# Patient Record
Sex: MALE | Race: WHITE | NOT HISPANIC OR LATINO | Employment: OTHER | URBAN - METROPOLITAN AREA
[De-identification: names, ages, dates, MRNs, and addresses within clinical notes are randomized per-mention and may not be internally consistent; named-entity substitution may affect disease eponyms.]

---

## 2024-03-26 ENCOUNTER — HOSPITAL ENCOUNTER (INPATIENT)
Facility: HOSPITAL | Age: 84
LOS: 4 days | Discharge: HOME | DRG: 057 | End: 2024-03-30
Attending: PHYSICAL MEDICINE & REHABILITATION | Admitting: PHYSICAL MEDICINE & REHABILITATION
Payer: MEDICARE

## 2024-03-26 RX ORDER — ATORVASTATIN CALCIUM 20 MG/1
10 TABLET, FILM COATED ORAL DAILY
Status: DISCONTINUED | OUTPATIENT
Start: 2024-03-26 | End: 2024-03-26

## 2024-03-26 RX ORDER — ASPIRIN 81 MG/1
81 TABLET, CHEWABLE ORAL
Status: DISCONTINUED | OUTPATIENT
Start: 2024-04-03 | End: 2024-03-30 | Stop reason: HOSPADM

## 2024-03-26 RX ORDER — SIMVASTATIN 20 MG
20 TABLET ORAL NIGHTLY
COMMUNITY

## 2024-03-26 RX ORDER — LISINOPRIL 10 MG/1
10 TABLET ORAL
Status: DISCONTINUED | OUTPATIENT
Start: 2024-03-27 | End: 2024-03-30 | Stop reason: HOSPADM

## 2024-03-26 RX ORDER — ASPIRIN 81 MG/1
81 TABLET, CHEWABLE ORAL DAILY
Status: ON HOLD | COMMUNITY
End: 2024-03-30

## 2024-03-26 RX ORDER — LISINOPRIL 10 MG/1
10 TABLET ORAL DAILY
COMMUNITY

## 2024-03-26 RX ORDER — EZETIMIBE 10 MG/1
10 TABLET ORAL DAILY
COMMUNITY

## 2024-03-26 RX ORDER — SIMVASTATIN 20 MG
20 TABLET ORAL NIGHTLY
Status: DISCONTINUED | OUTPATIENT
Start: 2024-03-26 | End: 2024-03-30 | Stop reason: HOSPADM

## 2024-03-26 RX ADMIN — METOPROLOL TARTRATE 25 MG: 25 TABLET, FILM COATED ORAL at 21:35

## 2024-03-26 NOTE — PROGRESS NOTES
Inpatient Rehabilitation Plan of Care Note    Plan of Care  Care Plan Reviewed - Updates as Follows    Safety    [RN] Potential for Injury(Active)  Current Status(03/26/2024): Uses call light appropriately  Weekly Goal(04/02/2024): Cue to use call bell  Discharge Goal: Family /caregivers regarding safety precautions and need for  close supervision    Performed Intervention(s)  Safety rounds  Bed alarm and/or chair alarm  Falls precautions/protocol      Sphincter Control    [RN] Bladder Management(Active)  Current Status(03/26/2024): Continent 100%  Weekly Goal(04/02/2024): Continent 100%  Discharge Goal: Continent 100%    [RN] Bowel Management(Active)  Current Status(03/26/2024): Continent 100%  Weekly Goal(04/02/2024): Continent 100%  Discharge Goal: Continent 100%    Performed Intervention(s)  Encourage fluid intake  Perineal care  Encourage appropriate diet      Psychosocial    [RN] Behavior(Active)  Current Status(03/26/2024): Supportive family  Weekly Goal(04/02/2024): Alow opportunity to express concerns regarding current  status  Discharge Goal: Adequate coping regarding life changes with ongoing support    Performed Intervention(s)  Verbalizes needs and concerns  Therapeutic environmental set up  Support/peer groups      Body Function Structure    [RN] Skin Integrity(Active)  Current Status(03/26/2024): Skin intact  Weekly Goal(04/02/2024): Skin intact  Discharge Goal: No skin breakdown/issues    Performed Intervention(s)  Daily skin inspection  Positioning  Turning    Signed by: Susannah Goodwin RN

## 2024-03-26 NOTE — DISCHARGE INSTRUCTIONS
Discharge follow up:  1) PCP - 1 to 2 weeks following discharge from rehab  2) Fermín Rosen, neurosurgery PA - 4 to 6 weeks     Neurosurgery at [facility] recommended holding ASA 81 for a full 2 weeks following the date of his hemorrhage    - Long-term BP goal less than 130/80  - Anti-Thrombotics - has coronary stents. Would resume ASA 81 mg daily in  2 weeks from March 19, 2024 which would be April 2, 2024, but will need close follow up for progression of cerebral amyloid angiopathy   Outpatient neurosurgery follow-up in 4 to 6 weeks    PREVIOUSLY REFERRED TO [facility] FOR OUTPATIENT PT,OT, SLP - DX: CVA    No driving.  No alcohol    Consider trial of Namenda-memantine for aphasia

## 2024-03-26 NOTE — PLAN OF CARE
Goal Outcome Evaluation:  Plan of Care Reviewed With: patient, family           Outcome Evaluation: New to Rehab, expressive aphasia, and assist x1. He is continent of b/b,inc at times. He takes medication with water, NIH at shift change, and no complaints. Discussed daily schedule, white board at nurses station, and almost completed data base. Dont leave alone, patient leans and has had several bouts of loss of balance.

## 2024-03-27 PROBLEM — I61.9 INTRACEREBRAL HEMORRHAGE: Status: ACTIVE | Noted: 2024-03-27

## 2024-03-27 PROCEDURE — 97166 OT EVAL MOD COMPLEX 45 MIN: CPT

## 2024-03-27 PROCEDURE — 97530 THERAPEUTIC ACTIVITIES: CPT

## 2024-03-27 PROCEDURE — 97161 PT EVAL LOW COMPLEX 20 MIN: CPT

## 2024-03-27 PROCEDURE — 96105 ASSESSMENT OF APHASIA: CPT

## 2024-03-27 PROCEDURE — 97116 GAIT TRAINING THERAPY: CPT

## 2024-03-27 PROCEDURE — 97112 NEUROMUSCULAR REEDUCATION: CPT

## 2024-03-27 PROCEDURE — 97535 SELF CARE MNGMENT TRAINING: CPT

## 2024-03-27 RX ORDER — ACETAMINOPHEN 325 MG/1
650 TABLET ORAL EVERY 6 HOURS PRN
Status: DISCONTINUED | OUTPATIENT
Start: 2024-03-27 | End: 2024-03-30 | Stop reason: HOSPADM

## 2024-03-27 RX ADMIN — Medication 20 MG: at 21:14

## 2024-03-27 RX ADMIN — METOPROLOL TARTRATE 25 MG: 25 TABLET, FILM COATED ORAL at 08:10

## 2024-03-27 RX ADMIN — LISINOPRIL 10 MG: 10 TABLET ORAL at 08:10

## 2024-03-27 RX ADMIN — METOPROLOL TARTRATE 25 MG: 25 TABLET, FILM COATED ORAL at 21:14

## 2024-03-27 NOTE — PLAN OF CARE
Goal Outcome Evaluation:  Plan of Care Reviewed With: patient, spouse           Outcome Evaluation: Negro has been cooperative, assist x1, and medication with water. He has been a BLUE ARM BAND,family requested 4 rails, and wife stays through the night. He has been continent of b/b, NIH at shift change, and family checked off the assist patient in room. He tolerated all therapy, wife at bedside, and no pain.

## 2024-03-27 NOTE — THERAPY TREATMENT NOTE
Inpatient Rehabilitation - Physical Therapy Initial Evaluation       [facility]     Patient Name: Robby Mccloud  : 1940  MRN: [id number]    Today's Date: 3/27/2024                    Admit Date: 3/26/2024      Visit Dx:   No diagnosis found.    Patient Active Problem List   Diagnosis    Intracerebral hemorrhage       Past Medical History:   Diagnosis Date    Cancer     Coronary artery disease     Elevated cholesterol     Hypertension     Stroke        Past Surgical History:   Procedure Laterality Date    ABDOMINAL SURGERY      CARDIAC CATHETERIZATION      COLONOSCOPY      SKIN BIOPSY         PT ASSESSMENT (Last 12 Hours)       IRF PT Evaluation and Treatment       Row Name 24 1100          PT Time and Intention    Document Type initial evaluation (P)   -JT     Mode of Treatment physical therapy (P)   -JT     Patient/Family/Caregiver Comments/Observations pt in BR with OT in am, no distress. Wife and son present for both am and pm sessions. (P)   -JT       Row Name 24 1100          General Information    Patient Profile Reviewed yes (P)   -JT     Limitations/Impairments safety/cognitive (P)   -JT       Row Name 24 1100          Previous Level of Function/Home Environm    Bathing/Grooming, Premorbid Functional Level independent (P)   -JT     Dressing, Premorbid Functional Level independent (P)   -JT     Eating/Feeding, Premorbid Functional Level independent (P)   -JT     Toileting, Premorbid Functional Level independent (P)   -JT     BADLs, Premorbid Functional Level independent (P)   -JT     IADLs, Premorbid Functional Level independent (P)   -JT     Bed Mobility, Premorbid Functional Level independent (P)   -JT     Transfers, Premorbid Functional Level independent (P)   -JT     Household Ambulation, Premorbid Functional Level independent (P)   -JT     Stairs, Premorbid Functional Level independent (P)   -JT     Community Ambulation, Premorbid Functional Level independent (P)   -JT        Row Name 03/27/24 1100          Living Environment    Current Living Arrangements home (P)   -JT     Home Accessibility stairs to enter home;stairs within home (P)   -JT     People in Home spouse (P)   -JT     Primary Care Provided by self (P)   -JT       Row Name 03/27/24 1100          Home Main Entrance    Number of Stairs, Main Entrance two (P)   -JT     Stair Railings, Main Entrance railing on left side (ascending) (P)   -JT       Row Name 03/27/24 1100          Stairs Within Home, Primary    Stairs, Within Home, Primary 12 (P)   -JT     Stairs Comment, Within Home, Primary pts family reports he does not need to ambulate these steps for any ADLs at home. (P)   -JT       Row Name 03/27/24 1100          Home Use of Assistive/Adaptive Equipment    Equipment Currently Used at Home none (P)   has a cane that he does not use.  -T       Row Name 03/27/24 1100          Pain Assessment    Pretreatment Pain Rating 0/10 - no pain (P)   -JT     Posttreatment Pain Rating 0/10 - no pain (P)   -JT       Row Name 03/27/24 1100          Cognition/Psychosocial    Affect/Mental Status (Cognition) WFL (P)   -JT     Orientation Status (Cognition) oriented x 4 (P)   -JT     Follows Commands (Cognition) follows one-step commands;over 90% accuracy;delayed response/completion;increased processing time needed (P)   -JT       Row Name 03/27/24 1100          Range of Motion (ROM)    Range of Motion bilateral lower extremities (P)   -T       Row Name 03/27/24 1100          Range of Motion Comprehensive    Comment, General Range of Motion ROM WFL, bilateral ankle DF/PF ROM deficits observed. (P)   -JT       [facility] Name 03/27/24 1100          Strength (Manual Muscle Testing)    Strength (Manual Muscle Testing) bilateral lower extremities (P)   -T       Row Name 03/27/24 1100          Strength Comprehensive (MMT)    Comment, General Manual Muscle Testing (MMT) Assessment 4/5 bilateral knee flexion, ankle DF, hip flexion. 5/5 bilateral knee  extension. (P)   -JT       Row Name 03/27/24 1100          Bed Mobility    Bed Mobility rolling left;rolling right;scooting/bridging;supine-sit;sit-supine (P)   -JT     Rolling Left Cannon (Bed Mobility) standby assist (P)   -JT     Rolling Right Cannon (Bed Mobility) standby assist (P)   -JT     Scooting/Bridging Cannon (Bed Mobility) standby assist (P)   -JT     Supine-Sit Cannon (Bed Mobility) standby assist (P)   -JT     Sit-Supine Cannon (Bed Mobility) standby assist (P)   -JT       Row Name 03/27/24 1100          Transfer Assessment/Treatment    Transfers bed-chair transfer;chair-bed transfer;sit-stand transfer;stand-sit transfer (P)   -JT       Row Name 03/27/24 1100          Bed-Chair Transfer    Bed-Chair Cannon (Transfers) contact guard (P)   -JT     Assistive Device (Bed-Chair Transfers) -- (P)   no AD  -JT       Row Name 03/27/24 1100          Chair-Bed Transfer    Chair-Bed [place] (Transfers) contact guard (P)   -JT     Assistive Device (Chair-Bed Transfers) -- (P)   no AD  -JT       Row Name 03/27/24 1100          Sit-Stand Transfer    Sit-Stand Cannon (Transfers) contact guard;standby assist (P)   -JT     Assistive Device (Sit-Stand Transfers) wheelchair (P)   -JT       Row Name 03/27/24 1100          Stand-Sit Transfer    Stand-Sit Cannon (Transfers) contact guard;standby assist (P)   -JT     Assistive Device (Stand-Sit Transfers) wheelchair (P)   -JT       Row Name 03/27/24 1100          Gait/Stairs (Locomotion)    [place] Level (Gait) standby assist;contact guard (P)   -JT     Assistive Device (Gait) walker, front-wheeled (P)   pt trialed RWx but ultimately tolerated ambulation without AD.  -JT     Patient was able to Ambulate yes (P)   -JT     Distance in Feet (Gait) -- (P)   80'x1 with FWW, 160'x1 with no AD  -JT     Pattern (Gait) step-through (P)   -JT     Deviations/Abnormal Patterns (Gait) base of support, narrow;bilateral deviations  (P)   -JT     Bilateral Gait Deviations forward flexed posture (P)   -JT     [place] Level (Stairs) verbal cues;contact guard (P)   -JT     Handrail Location (Stairs) both sides (P)   -JT     Number of Steps (Stairs) 12 (P)   -JT     Ascending Technique (Stairs) step-over-step (P)   -JT     Descending Technique (Stairs) step-over-step (P)   -JT       Row Name 03/27/24 1100          Balance    Balance Assessment sitting static balance;sitting dynamic balance;standing static balance;standing dynamic balance (P)   -JT     Dynamic Standing Balance minimal assist (P)   -JT     Comment, Balance pt performed 3x10 alternate tapping on cones at narciso bars with CGA-min A and no UE support. Pt also performed 2x30 seconds on balance pads by narciso bars with no UE support and min A, one LOB observed where pt corrected himself with min A. (P)   -JT       Row Name 03/27/24 1100          Positioning and Restraints    Pre-Treatment Position sitting in chair/recliner (P)   -JT     Post Treatment Position bed (P)   -JT     In Bed supine;exit alarm on;encouraged to call for assist;with family/caregiver;call light within reach (P)   -JT       Row Name 03/27/24 1100          Therapy Assessment/Plan (PT)    Functional Level at Time of Evaluation (PT) CGA-Gino (P)   -JT     PT Diagnosis (PT) coordination and balance deficits (P)   -JT     Rehab Potential/Prognosis (PT) good, to achieve stated therapy goals (P)   -JT     Frequency of Treatment (PT) 5 times per week;60 minutes per session (P)   -JT     Estimated Duration of Therapy (PT) 1 week (P)   -JT     Comment, Therapy Assessment/Plan (PT) pt is a 84 y/o male referred to PT s/p intracerebral hemmorhage and presenting with balance and coordination deficits. Pt was independent prior to his hospitalization and did not require the use of an AD. Pt states he has 2 steps to enter his home and 12 steps within the home, but pts family reports he is not required to ambulate the 12 steps  inside. Pt will benefit from skilled PT services to address his deficits and improve his functional mobility in order to promote safety and independence at home. (P)   -JT       Row Name 03/27/24 1100          IRF PT Goals    Bed Mobility Goal Selection (PT-IRF) bed mobility, PT goal 1 (P)   -JT     Transfer Goal Selection (PT-IRF) transfers, PT goal 1 (P)   -JT     Gait (Walking Locomotion) Goal Selection (PT-IRF) gait, PT goal 1 (P)   -JT     Stairs Goal Selection (PT-IRF) stairs, PT goal 1 (P)   -JT       Row Name 03/27/24 1100          Bed Mobility Goal 1 (PT-IRF)    Activity/Assistive Device (Bed Mobility Goal 1, PT-IRF) bed mobility activities, all (P)   -JT     [place] Level (Bed Mobility Goal 1, PT-IRF) independent (P)   -JT     Time Frame (Bed Mobility Goal 1, PT-IRF) 1 week (P)   -JT       Row Name 03/27/24 1100          Transfer Goal 1 (PT-IRF)    Activity/Assistive Device (Transfer Goal 1, PT-IRF) sit-to-stand/stand-to-sit;bed-to-chair/chair-to-bed (P)   -JT     [place] Level (Transfer Goal 1, PT-IRF) supervision required (P)   -JT     Time Frame (Transfer Goal 1, PT-IRF) 1 week (P)   -JT       Row Name 03/27/24 1100          Gait/Walking Locomotion Goal 1 (PT-IRF)    Activity/Assistive Device (Gait/Walking Locomotion Goal 1, PT-IRF) gait (walking locomotion) (P)   -JT     Gait/Walking Locomotion Distance Goal 1 (PT-IRF) 200' (P)   -JT     [place] Level (Gait/Walking Locomotion Goal 1, PT-IRF) supervision required (P)   -JT     Time Frame (Gait/Walking Locomotion Goal 1, PT-IRF) 1 week (P)   -JT       Row Name 03/27/24 1100          Stairs Goal 1 (PT-IRF)    Activity/Assistive Device (Stairs Goal 1, PT-IRF) ascending stairs;descending stairs;using handrail, left (P)   -JT     Number of Stairs (Stairs Goal 1, PT-IRF) 12 (P)   -JT     [place] Level (Stairs Goal 1, PT-IRF) standby assist (P)   -JT     Time Frame (Stairs Goal 1, PT-IRF) 1 week (P)   -JT               User Key  (r) =  Recorded By, (t) = Taken By, (c) = Cosigned By      Initials Name Provider Type    JT Elkin Caraballo, PT Student PT Student                     Physical Therapy Education       Title: PT OT SLP Therapies (In Progress)       Topic: Physical Therapy (In Progress)       Point: Mobility training (Not Started)       Learner Progress:  Not documented in this visit.              Point: Home exercise program (Not Started)       Learner Progress:  Not documented in this visit.              Point: Body mechanics (Not Started)       Learner Progress:  Not documented in this visit.              Point: Precautions (Done)       Learning Progress Summary             Patient Acceptance, E, VU by JT at 3/27/2024 1429                                         User Key       Initials Effective Dates Name Provider Type Discipline     01/24/24 -  Elkin Caraballo, PT Student PT Student PT                    PT Recommendation and Plan    Frequency of Treatment (PT): (P) 5 times per week, 60 minutes per session             Outcome Measures       Row Name 03/27/24 1400             Functional Gait Assessment (FGA)    Gait Level Surface 3 (P)   -JT      Change in Gait Speed 2 (P)   -JT      Gait with Horizontal Head Turns 0 (P)   -JT      Gait with Vertical Head Turns 3 (P)   -JT      Gait and Pivot Turn 3 (P)   -JT      Step Over Obstacle 0 (P)   -JT      Gait with Narrow Base of Support 2 (P)   -JT      Gait with Eyes Closed 3 (P)   -JT      Ambulating Backwards 3 (P)   -JT      Steps 2 (P)   -JT      FGA Total Score 21 (P)   -JT      FGA Comments pt displayed difficulty understanding instructions during assessment and results of this assessment may have been influenced by potential cognitive impairments. (P)   -JT         Functional Assessment    Outcome Measure Options FGA (Functional Gait Assessment) (P)   -JT                User Key  (r) = Recorded By, (t) = Taken By, (c) = Cosigned By      Initials Name Provider Type    JT Elkin Caraballo, PT  Student PT Student                         Time Calculation:      PT Charges       Row Name 03/27/24 1425 03/27/24 1146          Time Calculation    Start Time 1230 (P)   -JT 1100 (P)   -JT     Stop Time 1300 (P)   -JT 1130 (P)   -JT     Time Calculation (min) 30 min (P)   -JT 30 min (P)   -JT     PT Received On -- 03/27/24 (P)   -JT     PT - Next Appointment -- 03/28/24 (P)   -JT               User Key  (r) = Recorded By, (t) = Taken By, (c) = Cosigned By      Initials Name Provider Type    JT Elkin Caraballo, PT Student PT Student                    Therapy Charges for Today       Code Description Service Date Service Provider Modifiers Qty    01589501309 HC PT EVAL LOW COMPLEXITY 3 3/27/2024 Elkin Caraballo, PT Student GP 1    69843628508 HC GAIT TRAINING EA 15 MIN 3/27/2024 Elkin Caraballo, PT Student GP 1    60093743063 HC GAIT TRAINING EA 15 MIN 3/27/2024 Elkin Caraballo, PT Student GP 1    67268676437 HC PT THERAPEUTIC ACT EA 15 MIN 3/27/2024 Elkin Caraballo, PT Student GP 1              PT G-Codes  Outcome Measure Options: (P) FGA (Functional Gait Assessment)  AM-PAC 6 Clicks Score (PT): 17  FGA Total Score: (P) 21      Elkin Carabalol PT Student  3/27/2024

## 2024-03-27 NOTE — CONSULTS
"Nutrition Services    Patient Name:  Robby Mccloud  YOB: 1940  MRN: [id number]  Admit Date:  3/26/2024    Assessment Date:  03/27/24    Summary: Rehab Admission Assessment    Pt is a 83 year old male admitted to acute rehab following hospitalization for ICH. He transferred from [place] late yesterday afternoon.    Pt's current diet is Healthy heart. Appetite is good, intake 75% x 1 meal. Labs reviewed. Skin is intact. Weight is pending; 172# on 3/22/24 at [place] (ht listed 69\").     Plan/Recommend:  Encourage intake at meals.   Monitor intake, labs, weight and skin status.   RD to follow.      CLINICAL NUTRITION ASSESSMENT      Reason for Assessment Nurse Admission Screen     Admitting Diagnosis   * No active hospital problems. *  (ICH)     Medical/Surgical History Past Medical History:   Diagnosis Date    Cancer     Coronary artery disease     Elevated cholesterol     Hypertension     Stroke        Past Surgical History:   Procedure Laterality Date    ABDOMINAL SURGERY      CARDIAC CATHETERIZATION      COLONOSCOPY      SKIN BIOPSY          Current Nutrition Orders & Evaluation of Intake       Oral Nutrition      Food Allergies NKFA    Current PO Diet Diet: Cardiac; Healthy Heart (2-3 Na+); Fluid Consistency: Thin (IDDSI 0)    Supplement    PO Evaluation      PO Intake % 75% dinner (transfers from outside facility - no intake data)     Factors Affecting Intake No factors at this time   --  Anthropometrics        Current Height  Current Weight (CBW)  BMI kg/m2       There is no height or weight on file to calculate BMI.   BMI Category UTD   Ideal Weight (IBW) UTD   Usual Weight (UBW) 172# 3/22/24 at [place]   Weight Trend Unknown   Weight History Wt Readings from Last 15 Encounters:   No data found for Wt      --  Physical Findings          General Appearance alert, impaired vision noted by nursing   Oral/Mouth Cavity WDL   Edema  no edema   Gastrointestinal last bowel movement: 3/25   Skin  skin intact "   Tubes/Drains/Lines none   NFPE Not indicated at this time       Medications & Labs           Scheduled Medications [START ON 4/3/2024] aspirin, 81 mg, Oral, Q24H  lisinopril, 10 mg, Oral, Q24H  metoprolol tartrate, 25 mg, Oral, Q12H  simvastatin, 20 mg, Oral, Nightly       Infusions     PRN Medications   acetaminophen            Pertinent Labs   Results from last 7 days   Lab Units 03/22/24  1329   POTASSIUM mmol/L 3.8     Results from last 7 days   Lab Units 03/26/24  0415   HEMOGLOBIN g/dL 13.1*   HEMATOCRIT % 39.7*   WBC 10*3/uL 7.02     Results from last 7 days   Lab Units 03/26/24  0415 03/25/24  0337 03/24/24  0419 03/23/24  0343 03/22/24  0405   PLATELETS 10*3/uL 217 233 228 291 279     Lab Results   Component Value Date    HGBA1C 6.1 (H) 03/20/2024        Nutrition Diagnosis        Nutrition Dx Problem  Problem: Nutrition Appropriate for Condition at this Time  Etiology: MNT for Treatment/Condition   Signs/Symptoms: PO intake       NUTRITION INTERVENTION / PLAN OF CARE  Goals        Intervention Goal(s) Tolerate PO  and Maintain intake     Nutrition Intervention        RD Action Interview for preferences, Encourage intake, and Continue to monitor     Prescription        Diet Prescription    Supplement Prescription    Snack Prescription    EN Prescription    New Prescription Ordered? No changes at this time     Monitor/Evaluation        Monitor Per protocol   Discharge Needs Pending clinical course     RD to follow up per protocol.     Electronically signed by:  Leanna Montero RD  03/27/24 09:21 EDT

## 2024-03-27 NOTE — PROGRESS NOTES
SECTION GG    Self Care Performance:   Oral Hygiene: [place] provides verbal cues and/or touching/steadying and/or  contact guard assistance as patient completes activity.   Toileting Hygiene: [place] provides verbal cues and/or touching/steadying and/or  contact guard assistance as patient completes activity.   Shower/Bathe Self: [place] provides verbal cues and/or touching/steadying and/or  contact guard assistance as patient completes activity.   Upper Body Dressing: [place] provides verbal cues and/or touching/steadying  and/or contact guard assistance as patient completes activity.   Lower Body Dressing: [place] provides verbal cues and/or touching/steadying  and/or contact guard assistance as patient completes activity.   Putting On/Taking Off Footwear: [place] provides verbal cues and/or  touching/steadying and/or contact guard assistance as patient completes  activity.    Self Care Discharge Goals:   Toileting Hygiene: [place] provides verbal cues or touching/steadying assistance  as patient completes activity.    Mobility Toilet Transfer Performance: [place] provides verbal cues or  touching/steadying assistance as patient completes activity.    Mobility Toilet Transfer Discharge Goal: Branch    Signed by: Genet Redmond, OT

## 2024-03-27 NOTE — PROGRESS NOTES
Case Management  Inpatient Rehabilitation Plan of Care and Discharge Plan Note    Rehabilitation Diagnosis:  Branch  Date of Onset:  Branch    Medical Summary:  Branch  Past Medical History: Branch    Plan of Care  Updated Problems/Interventions  Field    Expected Intensity:  Branch  Interdisciplinary Team:  Thom  Estimated Length of Stay/Anticipated Discharge Date: Branch  Anticipated Discharge Destination:  Anticipated discharge destination from inpatient rehabilitation is community  discharge with assistance. Patient lives with wife in 2 story home with  bedroom/bath on first floor. One step entry  D/C plan is home with wife. Intermittent assist from adult children who live  local.      Based on the patient's medical and functional status, their prognosis and  expected level of functional improvement is:  Thom    Signed by: SEYMOUR Lowe

## 2024-03-27 NOTE — PROGRESS NOTES
Discharge Planning Assessment  [facility]     Patient Name: Robby Mccloud  MRN: [id number]  Today's Date: 3/27/2024    Admit Date: 3/26/2024    Plan: Patient will d/c home with wife and intermittent assist from 5 adult children. Will arrange follow up therapy as needed.   Discharge Needs Assessment    No documentation.                  Discharge Plan       Row Name 03/27/24 0946       Plan    Plan Patient will d/c home with wife and intermittent assist from 5 adult children. Will arrange follow up therapy as needed.    Patient/Family in Agreement with Plan yes    Plan Comments Completed assessment with patient, wife, and son, Chris. Patient lives with wife in 2 story home with bedroom/bath on first floor. One step to enter. Patient was active and independent prior to hospitalization. He walks regularly and was driving. He manages his own medications and household finances. Per family, patient was cognitively sharp prior to hospitalization. They have noticed some memory and word finding issues. Discharge plan is home with wife who is in good health and can assist as needed. They have 5 adult children who live local and are supportive. Discussed SW role, team and family conference/teaching. Will follow and assist with plans.                  Continued Care and Services - Admitted Since 3/26/2024    No active coordination exists for this encounter.          Demographic Summary    No documentation.                  Functional Status       Row Name 03/27/24 0935       Functional Status    Usual Activity Tolerance good    Current Activity Tolerance moderate    Functional Status Comments Patient is usually active and independent at home.       Functional Status, IADL    Medications independent    Meal Preparation assistive person;independent    Housekeeping independent;assistive person    Laundry assistive person    Shopping assistive person    IADL Comments Patient managed own medications and would help with cleaning  and grocery shopping. Wife does most of cooking. Patient drives.       Mental Status    General Appearance WDL WDL       Mental Status Summary    Recent Changes in Mental Status/Cognitive Functioning memory (recent);mental status    Mental Status Comments Patient denies any changes in thinking, but wife and son feel he has had changes in cognition and word finding.       Employment/    Employment Status retired    Current or Previous Occupation professional    Employment/ Comments Retired from [employer]       Row Name 03/27/24 0937       Values/Beliefs    Spiritual, Cultural Beliefs, Spiritism Practices, Values that Affect Care no    Values/Beliefs Comment [place]       Behavior WDL    Behavior WDL interactions    Interactions appropriate to situation;cooperative;eye contact appropriate       Emotion Mood WDL    Emotion/Mood/Affect WDL emotion mood    Emotion/Mood appropriate to situation       Speech WDL    Speech WDL X  family has noticed some word finding issues but not noted during assessment       Perceptual State WDL    Perceptual State WDL WDL       Thought Process WDL    Thought Process WDL judgment and insight    Judgment and Insight other (see comments)  Patient has decreased insight into some cognitive deficits noticed by family       Intellectual Performance WDL    Intellectual Performance WDL intellectual performance    Intellectual Performance forgetfulness;memory deficit, recent;impaired concentration       Coping/Stress    Major Change/Loss/Stressor medical condition/diagnosis;loss of independence    Patient Personal Strengths able to adapt;expressive of emotions;expressive of needs;positive attitude;motivated;strong support system;successful coping history    Sources of Support adult child(july);spouse    Techniques to [place] with Loss/Stress/Change diversional activities;exercise    Reaction to Health Status adjusting;hopeful;motivated     Understanding of Condition and Treatment partial understanding of medical condition;partial understanding of treatment    Coping/Stress Comments Patient denies any history or current issues with depression/anxiety. Feels he is coping well and is motivated for rehab       Developmental Stage (Eriksson's)    Developmental Stage Stage 8 (63 years-death/Late Adulthood) Integrity vs. Despair                   Abuse/Neglect    No documentation.                  Legal       Row Name 03/27/24 0941       Financial/Legal    Source of Income social security;pension/detention    Who Manages Finances if Patient Unable Wife;  Son, Leander is POA per wife.                   Substance Abuse    No documentation.                  Patient Forms    No documentation.                     ELFEGO Poole

## 2024-03-27 NOTE — THERAPY EVALUATION
Inpatient Rehabilitation - Occupational Therapy Initial Evaluation    [facility]     Patient Name: Robby Mccloud  : 1940  MRN: [id number]    Today's Date: 3/27/2024                 Admit Date: 3/26/2024       No diagnosis found.    Patient Active Problem List   Diagnosis    Intracerebral hemorrhage       Past Medical History:   Diagnosis Date    Cancer     Coronary artery disease     Elevated cholesterol     Hypertension     Stroke        Past Surgical History:   Procedure Laterality Date    ABDOMINAL SURGERY      CARDIAC CATHETERIZATION      COLONOSCOPY      SKIN BIOPSY               IRF OT ASSESSMENT FLOWSHEET (Last 12 Hours)       IRF OT Evaluation and Treatment       Row Name 24 1529          OT Time and Intention    Document Type initial evaluation  -AF     Mode of Treatment individual therapy;occupational therapy  -AF     Patient Effort good  -AF       Row Name 24 1529          General Information    Patient Profile Reviewed yes  -AF     Patient/Family/Caregiver Comments/Observations pt sitting up in bed with family present  -AF     General Observations of Patient wife present in both sessions  -AF     Existing Precautions/Restrictions fall  -AF     Limitations/Impairments safety/cognitive  -AF     Comment, General Information blue arm band  -AF       Row Name 24 1529          Pain Assessment    Pretreatment Pain Rating 0/10 - no pain  -AF     Posttreatment Pain Rating 0/10 - no pain  -AF       Row Name 24 1529          Cognition/Psychosocial    Orientation Status (Cognition) oriented to;person;place;situation  with cues priented to date, day of the week  -AF     Follows Commands (Cognition) follows one-step commands;75-90% accuracy;increased processing time needed;verbal cues/prompting required;repetition of directions required  -AF     Personal Safety Interventions fall prevention program maintained;gait belt;nonskid shoes/slippers when out of bed  -AF     Cognitive  Function attention deficit;safety deficit  -AF     Attention Deficit (Cognition) minimal deficit;focused/sustained attention  -AF     Safety Deficit (Cognition) impulsivity;insight into deficits/self-awareness;judgment;moderate deficit  -AF     Comment, Cognition required mutliple redirections for simple strength and coordination tasks, aphasia present, increased thinking time required and modificed of directions required at times. poor awareness of deficits noted from patient  -AF       Row Name 03/27/24 1529          Range of Motion (ROM)    Range of Motion bilateral upper extremities  -AF       Row Name 03/27/24 1529          Range of Motion Comprehensive    Comment, General Range of Motion BUE AROM WFL  -AF       Row Name 03/27/24 1529          Strength (Manual Muscle Testing)    Strength (Manual Muscle Testing) bilateral upper extremities;other (see comments)  -AF     Left Hand, Setting 2 (Dynamometer Testing) 50  -AF     Right Hand, Setting 2 (Dynamometer Testing) 35  -AF     Left Hand: Tip (Pincer) Pinch Strength (Pinch Dynamometer Testing) 7  -AF     Left Hand: Lateral (Key) Pinch Strength (Pinch Dynamometer Testing) 11  -AF     Right Hand: Tip (Pincer) Pinch Strength (Pinch Dynamometer Testing) 10  -AF     Right Hand: Lateral (Key) Pinch Strength (Pinch Dynamometer Testing) 8  -AF     Additional Documentation Left Hand, Setting 2 (Dynamometer Testing) (Row);Left Hand: Lateral (Key) Pinch Strength (Pinch Dynamometer Testing) (Row);Left Hand: Tip (Pincer) Pinch Strength (Pinch Dynamometer Testing) (Row);Right Hand, Setting 2 (Dynamometer Testing) (Row);Right Hand: Lateral (Key) Pinch Strength (Pinch Dynamometer Testing) (Row);Right Hand: Tip (Pincer) Pinch Strength (Pinch Dynamometer Testing) (Row)  -AF       Row Name 03/27/24 1529          Strength Comprehensive (MMT)    General Manual Muscle Testing (MMT) Assessment upper extremity strength deficits identified  -AF     Comment, General Manual Muscle Testing  (MMT) Assessment BUE 4/5  -AF       Row Name 03/27/24 1529          Sensory    Additional Documentation Vision Assessment/Intervention (Group);Hearing Assessment (Group);Sensory Interventions (Group)  -       Row Name 03/27/24 1529          Vision Assessment/Intervention    Vision Assessment Comment at times inattention to activity/scanning for items on the R side noted, aphasia and cognition might be limiting accuracy of vision assessments, pt wears glasses at all times  -       Row Name 03/27/24 1529          Sensory Interventions    Comment, Sensory Intervention has a diffiuclty time following directions with simple eyes closed light touch testing, preservated on L side with words and with movement when asked to moved which arm had been touched  -       Row Name 03/27/24 1529          Basic Activities of Daily Living (BADLs)    Basic Activities of Daily Living bathing;upper body dressing;lower body dressing;grooming;toileting  -AF       Row Name 03/27/24 1529          Bathing    [place] Level (Bathing) bathing skills;lower body;upper body;contact guard assist;verbal cues  -AF     Assistive Device (Bathing) grab bar/tub rail;hand held shower spray hose;tub bench  -AF     Position (Bathing) supported sitting;supported standing  -AF     Comment (Bathing) poor safety awareness noted, no LOB noted  -AF       Row Name 03/27/24 1529          Upper Body Dressing    [place] Level (Upper Body Dressing) upper body dressing skills;contact guard  SBA  -AF     Comment (Upper Body Dressing) when seated set up, when standing CGA  -AF       [facility] Name 03/27/24 1529          Lower Body Dressing    [place] Level (Lower Body Dressing) doff;don;pants/bottoms;shoes/slippers;socks;underwear;contact guard assist  -AF     Position (Lower Body Dressing) supported sitting;supported standing  -AF     Comment (Lower Body Dressing) vc;'s to sit to start pants  -AF       [facility] Name 03/27/24 1529          Grooming     [place] Level (Grooming) grooming skills;set up;verbal cues  -AF     Position (Grooming) supported sitting  -AF     Comment (Grooming) w/c level, vc's for throughness on R side of his face when shaving  -AF       Row Name 03/27/24 1529          Toileting    Comment (Toileting) declined when therapist presnet  -[facility] Name 03/27/24 1529          Bed Mobility    Bed Mobility supine-sit;sit-supine  -AF     Supine-Sit [place] (Bed Mobility) standby assist  -AF     Sit-Supine [place] (Bed Mobility) standby assist  -AF       Row Name 03/27/24 1529          Functional Mobility    Functional Mobility- Comment CGA without AD in room vc's for safety  -[facility] Name 03/27/24 1529          Transfer Assessment/Treatment    Transfers bed-chair transfer;chair-bed transfer;sit-stand transfer;stand-sit transfer;toilet transfer;shower transfer  -       Row Name 03/27/24 1529          Bed-Chair Transfer    Bed-Chair [place] (Transfers) contact guard;standby assist  -AF       Row Name 03/27/24 1529          Chair-Bed Transfer    Chair-Bed [place] (Transfers) contact guard;standby assist  -AF       Row Name 03/27/24 1529          Sit-Stand Transfer    Sit-Stand [place] (Transfers) contact guard;standby assist  -AF       Row Name 03/27/24 1529          Stand-Sit Transfer    Stand-Sit [place] (Transfers) contact guard;standby assist  -AF     Comment, (Stand-Sit Transfer) sits quickly in AM sessions almost tipping the w/c decreased safety awareness sat quickly 2 more times during session with same result  -       Row Name 03/27/24 1529          Toilet Transfer    Comment, (Toilet Transfer) declined during OT session  -AF       Row Name 03/27/24 1529          Shower Transfer    Type (Shower Transfer) sit-stand;stand-sit  -AF     [place] Level (Shower Transfer) contact guard;verbal cues  -AF     Assistive Device (Shower Transfer) grab bar, tub/shower;tub bench  -       Row Name 03/27/24  1529          Motor Skills    Motor Skills coordination;functional endurance  -AF     Coordination 9 Hole Peg Test of Fine Motor Coordination Results  -AF     Results, 9 Hole Peg Test of Fine Motor Coordination RUE: 32, LUE: 34, boxs and blocks RUE: 39, LUE: 46  -AF     Therapeutic Exercise shoulder;elbow/forearm;wrist;hand  -AF       Row Name 03/27/24 1529          Balance    Balance Assessment sitting static balance;sitting dynamic balance;standing static balance;standing dynamic balance  -AF     Static Sitting Balance standby assist  -AF     Dynamic Sitting Balance standby assist  -AF     Dynamic Standing Balance contact guard  during ADL tasks  -AF       Row Name 03/27/24 1529          Positioning and Restraints    Pre-Treatment Position in bed  -AF     Post Treatment Position bed  -AF     In Bed fowlers;call light within reach;encouraged to call for assist;exit alarm on;with family/caregiver  with wife in PM  -AF     In Wheelchair sitting;with PT  with PT in AM  -AF       Row Name 03/27/24 1529          Therapy Assessment/Plan (OT)    OT Diagnosis Pt admitted to rehab following IPH with vasogenic edema, metabolic encephalopthy. Pt demos decreased safety awareness, direction following, attention evie to the R side, decreased RUE strength and coordination which is limiting his safe completing of ADLS. pt may benefit from skilled OT services prior to d/c home with 24 HOUR SUPERVISION and outpatient OT .  -AF     Rehab Potential/Prognosis (OT) good, to achieve stated therapy goals  -AF     Frequency of Treatment (OT) 5 times per week  -AF     Estimated Duration of Therapy (OT) 1 week  -AF     Planned Therapy Interventions (OT) activity tolerance training;adaptive equipment training;BADL retraining;cognitive/visual perception retraining;functional balance retraining;neuromuscular control/coordination retraining;occupation/activity based interventions;patient/caregiver education/training;ROM/therapeutic  exercise;strengthening exercise;transfer/mobility retraining  -AF       Row Name 03/27/24 1529          Therapy Plan Review/Discharge Plan (OT)    Therapy Plan Review (OT) evaluation/treatment results reviewed;care plan/treatment goals reviewed;participants voiced agreement with care plan;patient;participants included  -AF       Row Name 03/27/24 1529          IRF OT Goals    Transfer Goal Selection (OT-IRF) transfers, OT goal 1;transfers, OT goal 2  -AF     Bathing Goal Selection (OT-IRF) bathing, OT goal 1;bathing, OT goal 2  -AF     UB Dressing Goal Selection (OT-IRF) UB dressing, OT goal 1;UB dressing, OT goal 2  -AF     LB Dressing Goal Selection (OT-IRF) LB dressing, OT goal 1;LB dressing, OT goal 2  -AF     Grooming Goal Selection (OT-IRF) grooming, OT goal 1;grooming, OT goal 2  -AF     Toileting Goal Selection (OT-IRF) toileting, OT goal 1;toileting, OT goal 2  -AF     ROM Goal Selection (OT-IRF) ROM, OT goal 2 (free text);ROM, OT goal 1 (free text)  -AF     Strength Goal Selection (OT-IRF) strength, OT goal 1 (free text);strength, OT goal 2 (free text)  -AF     Balance Goal Selection (OT) balance, OT goal 1;balance, OT goal 2  -AF     Endurance Goal Selection (OT) endurance, OT goal 1;endurance, OT goal 2  -AF     Isolated Movement Goal Selection (OT) isolated movement, OT goal 1;isolated movement, OT goal 2  -AF     Functional Mobility Goal Selection (OT) functional mobility, OT goal 1;functional mobility, OT goal 2  -AF     Coordination Goal Selection (OT) coordination, OT goal 1;coordination, OT goal 2  -AF     Caregiver Training Goal Selection (OT-IRF) caregiver training, OT goal 1;caregiver training, OT goal 2  -AF       Row Name 03/27/24 1529          Transfer Goal 1 (OT-IRF)    Activity/Assistive Device (Transfer Goal 1, OT-IRF) toilet;shower chair;walk-in shower  -AF     [place] Level (Transfer Goal 1, OT-IRF) supervision required;standby assist  -AF     Time Frame (Transfer Goal 1, OT-IRF)  long-term goal (LTG)  -AF     Progress/Outcomes (Transfer Goal 1, OT-IRF) goal ongoing  -AF       Row Name 03/27/24 1529          Bathing Goal 1 (OT-IRF)    Activity/Device (Bathing Goal 1, OT-IRF) bathing skills, all;hand-held shower spray hose;grab bar, tub/shower  -AF     [place] Level (Bathing Goal 1, OT-IRF) supervision required  -AF     Time Frame (Bathing Goal 1, OT-IRF) long-term goal (LTG)  -AF     Progress/Outcomes (Bathing Goal 1, OT-IRF) goal ongoing  -AF       Row Name 03/27/24 1529          UB Dressing Goal 1 (OT-IRF)    Activity/Device (UB Dressing Goal 1, OT-IRF) upper body dressing  -AF     [place] (UB Dress Goal 1, OT-IRF) supervision required  -AF     Time Frame (UB Dressing Goal 1, OT-IRF) long-term goal (LTG)  -AF     Progress/Outcomes (UB Dressing Goal 1, OT-IRF) goal ongoing  -AF       Row Name 03/27/24 1529          LB Dressing Goal 1 (OT-IRF)    Activity/Device (LB Dressing Goal 1, OT-IRF) lower body dressing  -AF     [place] (LB Dressing Goal 1, OT-IRF) supervision required  -AF     Time Frame (LB Dressing Goal 1, OT-IRF) long-term goal (LTG)  -AF     Progress/Outcomes (LB Dressing Goal 1, OT-IRF) goal ongoing  -AF       Row Name 03/27/24 1529          Grooming Goal 1 (OT-IRF)    Activity/Device (Grooming Goal 1, OT-IRF) grooming skills, all  -AF     [place] (Grooming Goal 1, OT-IRF) supervision required  -AF     Time Frame (Grooming Goal 1, OT-IRF) long-term goal (LTG)  -AF     Progress/Outcomes (Grooming Goal 1, OT-IRF) goal ongoing  -AF       Row Name 03/27/24 1529          Toileting Goal 1 (OT-IRF)    Activity/Device (Toileting Goal 1, OT-IRF) toileting skills, all;grab bar/safety frame  -AF     [place] Level (Toileting Goal 1, OT-IRF) supervision required  -AF     Progress/Outcomes (Toileting Goal 1, OT-IRF) goal ongoing  -AF     Time Frame (Toileting Goal 1, OT-IRF) long-term goal (LTG)  -AF       Row Name 03/27/24 1529          Strength Goal 1 (OT-IRF)     Strength Goal 1 (OT-IRF) increase strength in R hand by #5 to assist with ADL tasks  -AF     Time Frame (Strength Goal 1, OT-IRF) long-term goal (LTG)  -AF     Progress/Outcomes (Strength Goal 1, OT-IRF) goal ongoing  -AF       Row Name 03/27/24 1529          Balance Goal 1 (OT)    Activity/Assistive Device (Balance Goal 1, OT) standing, static;standing, dynamic  -AF     [place] Level/Cues Needed (Balance Goal 1, OT) supervision required  with ADL tasks  -AF     Time Frame (Balance Goal 1, OT) long term goal (LTG)  -AF     Progress/Outcomes (Balance Goal 1, OT) goal ongoing  -AF       Row Name 03/27/24 1529          Functional Mobility Goal 1 (OT)    [place] Level/Cues Needed (Functional Mobility Goal 1, OT) supervision required  -AF     Distance Goal 1 (Functional Mobility, OT) to and from bathroom  -AF     Time Frame (Functional Mobility Goal 1, OT) long term goal (LTG)  -AF     Progress/Outcome (Functional Mobility Goal 1, OT) goal ongoing  -       Row Name 03/27/24 1529          Coordination Goal 1 (OT)    Activity/Assistive Device (Coordination Goal 1, OT) FM task  -AF     [place] Level/Cues Needed (Coordination Goal 1, OT) set-up required  -AF     Time Frame (Coordination Goal 1, OT) long term goal (LTG)  -AF     Barriers (Coordination Goal 1, OT) increased boxs and blocks by 5 on R hand  -AF     Progress/Outcomes (Coordination Goal 1, OT) goal ongoing  -       Row Name 03/27/24 1529          Caregiver Training Goal 1 (OT-IRF)    Caregiver Training Goal 1 (OT-IRF) pt and family will demos safe techniques with ADLs, HEP, transfers prior to d/c home  -AF     Time Frame (Caregiver Training Goal 1, OT-IRF) long-term goal (LTG)  -AF     Progress/Outcomes (Caregiver Training Goal 1, OT-IRF) goal ongoing  -AF               User Key  (r) = Recorded By, (t) = Taken By, (c) = Cosigned By      Initials Name Effective Dates    AF Genet Redmond, OTR 06/16/21 -                      Occupational Therapy  Education       Title: PT OT SLP Therapies (In Progress)       Topic: Occupational Therapy (In Progress)       Point: ADL training (In Progress)       Description:   Instruct learner(s) on proper safety adaptation and remediation techniques during self care or transfers.   Instruct in proper use of assistive devices.                  Learning Progress Summary             Patient Acceptance, E, NR by AF at 3/27/2024 1545    Comment: attempted education on awareness of deficits with patient requires reinforcement. pt was not able to state goals for rehab   Family Acceptance, E, NR by AF at 3/27/2024 1545    Comment: attempted education on awareness of deficits with patient requires reinforcement. pt was not able to state goals for rehab                         Point: Home exercise program (In Progress)       Description:   Instruct learner(s) on appropriate technique for monitoring, assisting and/or progressing therapeutic exercises/activities.                  Learning Progress Summary             Patient Acceptance, E, NR by AF at 3/27/2024 1545    Comment: attempted education on awareness of deficits with patient requires reinforcement. pt was not able to state goals for rehab   Family Acceptance, E, NR by AF at 3/27/2024 1545    Comment: attempted education on awareness of deficits with patient requires reinforcement. pt was not able to state goals for rehab                         Point: Precautions (In Progress)       Description:   Instruct learner(s) on prescribed precautions during self-care and functional transfers.                  Learning Progress Summary             Patient Acceptance, E, NR by AF at 3/27/2024 1545    Comment: attempted education on awareness of deficits with patient requires reinforcement. pt was not able to state goals for rehab   Family Acceptance, E, NR by AF at 3/27/2024 1545    Comment: attempted education on awareness of deficits with patient requires reinforcement. pt was not  able to state goals for rehab                         Point: Body mechanics (In Progress)       Description:   Instruct learner(s) on proper positioning and spine alignment during self-care, functional mobility activities and/or exercises.                  Learning Progress Summary             Patient Acceptance, E, NR by AF at 3/27/2024 1545    Comment: attempted education on awareness of deficits with patient requires reinforcement. pt was not able to state goals for rehab   Family Acceptance, E, NR by AF at 3/27/2024 1545    Comment: attempted education on awareness of deficits with patient requires reinforcement. pt was not able to state goals for rehab                                         User Key       Initials Effective Dates Name Provider Type Discipline     06/16/21 -  Genet Redmond, OTR Occupational Therapist OT                        OT Recommendation and Plan    Planned Therapy Interventions (OT): activity tolerance training, adaptive equipment training, BADL retraining, cognitive/visual perception retraining, functional balance retraining, neuromuscular control/coordination retraining, occupation/activity based interventions, patient/caregiver education/training, ROM/therapeutic exercise, strengthening exercise, transfer/mobility retraining               Outcome Measures       Row Name 03/27/24 [facility]             Functional Gait Assessment (FGA)    Gait Level Surface 3  -DP (r) JT (t) DP (c)      Change in Gait Speed 2  -DP (r) JT (t) DP (c)      Gait with Horizontal Head Turns 0  -DP (r) JT (t) DP (c)      Gait with Vertical Head Turns 3  -DP (r) JT (t) DP (c)      Gait and Pivot Turn 3  -DP (r) JT (t) DP (c)      Step Over Obstacle 0  -DP (r) JT (t) DP (c)      Gait with Narrow Base of Support 2  -DP (r) JT (t) DP (c)      Gait with Eyes Closed 3  -DP (r) JT (t) DP (c)      Ambulating Backwards 3  -DP (r) JT (t) DP (c)      Steps 2  -DP (r) JT (t) DP (c)      FGA Total Score 21  -DP (r) JT (t)       FGA Comments pt displayed difficulty understanding instructions during assessment and results of this assessment may have been influenced by potential cognitive impairments.  -DP (r) JT (t) DP (c)         Functional Assessment    Outcome Measure Options FGA (Functional Gait Assessment)  -DP (r) JT (t) DP (c)                User Key  (r) = Recorded By, (t) = Taken By, (c) = Cosigned By      Initials Name Provider Type    DP Jace Arizmendi, PT Physical Therapist    JElkin Pruett, PT Student PT Student                      Time Calculation:      Time Calculation- OT       Row Name 03/27/24 1546 03/27/24 1545          Time Calculation- OT    OT Start Time 1330  -AF 1030  -AF     OT Stop Time 1400  -AF 1100  -AF     OT Time Calculation (min) 30 min  -AF 30 min  -AF               User Key  (r) = Recorded By, (t) = Taken By, (c) = Cosigned By      Initials Name Provider Type    AF Genet Redmond, OTJEFFREY Occupational Therapist                  Therapy Charges for Today       Code Description Service Date Service Provider Modifiers Qty    31451709274 HC OT EVAL MOD COMPLEXITY 3 3/27/2024 Genet Redmond OTR GO 1    70781305437 HC OT NEUROMUSC RE EDUCATION EA 15 MIN 3/27/2024 Genet Redmond OTJEFFREY GO 1    04835114963 HC OT SELF CARE/MGMT/TRAIN EA 15 MIN 3/27/2024 Genet Redmond OTJEFFREY GO 1                     ELIDIA Bañuelos  3/27/2024

## 2024-03-27 NOTE — PROGRESS NOTES
SECTION GG    Mobility Performance:     Roll Left and Right: [place] provides verbal cues and/or touching/steadying  and/or contact guard assistance as patient completes activity. Assistance may be  provided throughout the activity or intermittently.   Sit to Lying: [place] provides verbal cues and/or touching/steadying and/or  contact guard assistance as patient completes activity. Assistance may be  provided throughout the activity or intermittently.   Lying to Sitting on Side of Bed: [place] provides verbal cues and/or  touching/steadying and/or contact guard assistance as patient completes  activity. Assistance may be provided throughout the activity or intermittently.   Sit to Stand: [place] provides verbal cues and/or touching/steadying and/or  contact guard assistance as patient completes activity. Assistance may be  provided throughout the activity or intermittently.   Chair/Bed to Chair Transfer: [place] provides verbal cues and/or  touching/steadying and/or contact guard assistance as patient completes  activity. Assistance may be provided throughout the activity or intermittently.   Car Transfer: [place] provides verbal cues and/or touching/steadying and/or  contact guard assistance as patient completes activity. Assistance may be  provided throughout the activity or intermittently.   Walk 10 Feet:   [place] provides verbal cues and/or touching/steadying and/or  contact guard assistance as patient completes activity. Assistance may be  provided throughout the activity or intermittently.  Walk 50 Feet with 2 Turns:   [place] provides verbal cues and/or  touching/steadying and/or contact guard assistance as patient completes  activity. Assistance may be provided throughout the activity or intermittently.  Walk 150 Feet:   [place] provides verbal cues and/or touching/steadying and/or  contact guard assistance as patient completes activity. Assistance may be  provided throughout the activity or  intermittently.  Walking 10 Feet on Uneven Surfaces:   [place] provides verbal cues and/or  touching/steadying and/or contact guard assistance as patient completes  activity. Assistance may be provided throughout the activity or intermittently.  1 Step Over Curb or Up/Down Stair:   [place] provides verbal cues and/or  touching/steadying and/or contact guard assistance as patient completes  activity. Assistance may be provided throughout the activity or intermittently.  4 Steps Up and Down, With/Without Rail:   [place] provides verbal cues and/or  touching/steadying and/or contact guard assistance as patient completes  activity. Assistance may be provided throughout the activity or intermittently.  12 Steps Up and Down, With/Without Rail:   [place] provides verbal cues and/or  touching/steadying and/or contact guard assistance as patient completes  activity. Assistance may be provided throughout the activity or intermittently.  Picking up an Object:   [place] provides verbal cues and/or touching/steadying  and/or contact guard assistance as patient completes activity. Assistance may be  provided throughout the activity or intermittently.  Uses Wheelchair/Scooter: No    Mobility Discharge Goals:   Walk Discharge Goals:   12 Steps Up and Down, With/Without Rail: [place] provides verbal cues or  touching/steadying assistance as patient completes activity.    Section J. Health Conditions (Pain):  Pain Interference with Therapy Activities:   Rarely or not at all  Pain Interference with Day-to-Day Activities:   Rarely or not at all    Signed by: Brennon Caraballo, Student PT    I was present and/or participated in treatment and agree with this note. -  CoSigned By: Jace Arizmendi, PT 3/27/2024 2:53:02 PM

## 2024-03-27 NOTE — PROGRESS NOTES
Inpatient Rehabilitation Plan of Care Note    Plan of Care  Updated Problems/Interventions  Mobility    [PT] Bed/Chair/Wheelchair(Active)  Current Status(03/27/2024): CGA no AD  Weekly Goal(04/03/2024): SBA no AD  Discharge Goal: SBA no AD    [PT] Walk(Active)  Current Status(03/27/2024): 150' CGA no AD  Weekly Goal(04/03/2024): BR w nsg  Discharge Goal: 150' SBA no AD    [PT] Stairs(Active)  Current Status(03/27/2024): 12 stairs CGA  Weekly Goal(04/03/2024): 12 stairs SBA  Discharge Goal: 12 stairs SBA    Signed by: Brennon Caraballo, Student PT    I was present and/or participated in treatment and agree with this note. -  CoSigned By: Jace Arizmendi, PT 3/27/2024 2:54:32 PM

## 2024-03-27 NOTE — PLAN OF CARE
Goal Outcome Evaluation:  Plan of Care Reviewed With: patient        Progress: improving  Outcome Evaluation: pt A/O x  3 forgetful at times, pt takes meds whole with water, pt has dfifculty with word finding. pt wife at bedsind during night shift, pt wife requested 4 rails up. pt had no complaints of pain.

## 2024-03-27 NOTE — H&P
[facility]   HISTORY AND PHYSICAL    Patient Name: Robby Mccloud  : 1940  MRN: [id number]  Primary Care Physician:  Ariana Back APRN  Date of admission: 3/26/2024    Subjective   Subjective     Chief Complaint:   Status post intraparenchymal hemorrhage-4.3 cm-left frontal lobe with scattered chronic parenchymal microhemorrhages  Amyloid angiopathy  Impaired cognition, impaired mobility, impaired self-care, impaired speech  Hold aspirin 81 mg for 2 weeks from   Hypertension-long-term goal 130/80.  Metoprolol/lisinopril  Hyperlipidemia-simvastatin  Coronary disease status post PCI 2017.  2 weeks from   DVT prophylaxis-SCDs    History of Present Illness  Patient is an 83-year-old male transferred from [facility] after intraparenchymal hemorrhage.  He initially presented on 2024 with altered mental status, word finding difficulties.  He was on aspirin 81 mg prior to admission.  Right frontal lobe intraparenchymal hemorrhage on admission CT of the head.  Neurosurgery and neurocritical care were consulted.  Was on a Cardene drip for uncontrolled hypertension during his initial time in the ICU.  Subsequent MRI of the brain with and without con trans revealed no significant change in large frontal intraparenchymal hemorrhage.  In addition there were noted to be superimposed scattered supratentorial chronic parenchymal microhemorrhages which in conjunction with a left frontal hematoma led to suspicion of cerebral aneurysm myeloid angiopathy  He is still on aspirin for 2 weeks from the date of the initial hemorrhage per neurosurgery.    Functionally, reading comprehension yes/no questions 2/5 with 5/5 with max cues.    Reading 8 errors with phonemic paraphasias, meeting words.  Follows one-step directions 100%.  Picture description 70% with perseveration and phonemic paraphasias.  Dynamic standing balance fair minus.  Contact-guard to min assist for balance  activities.  Ran into a door frame on the right side returning to his room.  Decreased visual scanning during task.  Ambulated 30 feet x 3 minimal contact-guard assist.  Significant difficulty following commands and performing cognitive task while actively moving-difficulty with dual tasking    Given his functional impairments and comorbidities now admitted for acute inpatient rehabilitation      Patient denies any headaches.  He does not describe his vision well.  Does not describe any focal weakness.  Continues with difficulty with his speech.  Indicates tolerating therapies.  No bowel or bladder complaints.  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 Otherwise pertinent FHx was reviewed and not pertinent to current issue.  Family History:  Family History   Problem Relation Age of Onset    Tuberculosis Mother    Cancer, Other or Unknown Type Father   prostate ca    Cancer, Other or Unknown Type Brother   prostate ca    Diabetes Brother    Seizures Brother    Prostate cancer Brother    Social History:  reports that he has never smoked. He has never used smokeless tobacco. He reports current alcohol use of about 2.0 standard drinks of alcohol per week. He reports that he does not use drugs.  Enjoys yard work, pool volleyball, and rock and roll music.   .  Previously referral sent to [facility] [place] for outpatient PT OT and speech services.  Home Medications:  aspirin, ezetimibe, lisinopril, metoprolol tartrate, and simvastatin    Allergies:  Allergies   Allergen Reactions    Lipitor [Atorvastatin] Unknown - Low Severity     Family unsure, will check       Objective    Objective     Vitals:   Temp:  [97.8 °F (36.6 °C)-98.8 °F (37.1 °C)] 98.7 °F (37.1 °C)  Heart Rate:  [53-73] 73  Resp:  [18-20] 20  BP: (125-159)/(66-76) 125/66    Physical Exam    MENTAL STATUS -  AWAKE / ALERT  HEENT- NCAT, PUPILS EQUALLY ROUND, SCLERAE ANICTERIC, CONJUNCTIVAE PINK, OP MOIST, NO JVD, EARS UNREMARKABLE EXTERNALLY  LUNGS - CTA, NO WHEEZES, RALES OR RHONCHI  HEART- RRR, NO RUB, MURMUR, OR GALLOP  ABD - NORMOACTIVE BOWEL SOUNDS, SOFT, NT. NO HEPATOSPLENOMEGALY APPRECIATED  EXT - NO EDEMA OR CYANOSIS  NEURO -expressive language deficits, paraphasias, word finding difficulties.  Follows single step commands.  Recognizes finger movement bilaterally.  Face symmetric.  No dysarthria.  MOTOR EXAM - RUE/RLE 5/5. LUE/LLE 5/5.      Result Review    Result Review:     Ref Range & Units 1 d ago   White Blood Count  4.5 - 11.0 10*3/uL 7.02   Red Blood Count  4.5 - 5.9 10*6/uL 4.42 Low    Hemoglobin  13.5 - 17.5 g/dL 13.1 Low    Hematocrit  41.0 - 53.0 % 39.7 Low    Mean Corpuscular Volume  80.0 - 100.0 fL 89.8   Mean Corpuscular Hemoglobin  26.0 - 34.0 pg 29.6   Mean Corpuscular HGB Conc  31.0 - 37.0 g/dL 33.0   Red Cell Distribution Width-CV  12.0 - 16.8 % 13.7   Platelet Count  140 - 440 10*3/uL 217   Mean Platelet Volume  8.4 - 12.4 fL 10.0   Diff Type  (arb'U) Hospital CBC w/AutoDiff   Neutrophils %  45 - 80 % 57.8    Lymphocyte %  15 - 50 % 23.9   Monocyte %  0 - 15 % 14.1   Eosinophil%  0 - 7 % 2.0   BASO%  0 - 2 % 0.6   Immature Granulocyte%  0.0 - 1.0 % 1.6 High    Nucleated RBC %  0 /100(WBC) 0   Neutrophil Abs  2.0 - 8.8 10*3/uL 4.06   Lymphocyte-Absolute  0.7 - 5.5 10*3/uL 1.68   Monocyte Absolute  0.0 - 1.7 10*3/uL 0.99   EOS-Absolute  0.0 - 0.8 10*3/uL 0.14   Basophil Abs  0.0 - 0.2 10*3/uL 0.04   Immature Granulocyte Abs  0.00 - 0.10 10*3/uL 0.11 High    Resulting Agency [facility]   Specimen Collected: 03/26/24 04:15    Performed by: [facility] ([id number]) Last Resulted: 03/26/24 04:28   Received From: [facility]  Result Received: 03/26/24 18:03     Ref Range & Units 1 d ago Comments   Sodium  136 - 145 mmol/L 135 Low  (note)  Excess protein and/or lipids can falsely decrease sodium levels  (pseudo hyponatremia).   Potassium  3.5 - 5.1 mmol/L 3.8 Falsely elevated potassium can occur in patients with high WBC or platelet counts.   Chloride  98 - 107 mmol/L 106 (note)  Falsely elevated chloride levels can be seen in patients taking  medications which contain bromide.   Carbon Dioxide  22 - 29 mmol/L 19 Low     Anion Gap  5 - 13 (arb'U) 10 (note)  Calculation- Na - (Cl + CO2)   Glucose  71 - 99 mg/dL 114 High  (note)  Reference range based on inpatient hypo/hyperglycemic treatment  levels. A random glucose =/>200 is concerning for poor control.   Blood Urea Nitrogen (BUN)  8 - 26 mg/dL 17    Creatinine-Blood  0.73 - 1.18 mg/dL 0.79    BUN/Creatinine Ratio  RATIO 21.5    Estimated GFR (Cr)  >60 /1.73 m2 87 (note)  eGFR calculated based on IDMS traceable, enzymatic creatinine  method using the CKD-EPI 2021 equation.   Calcium  8.4 - 10.2 mg/dL 8.3 Low     Resulting Agency [facility]    Specimen Collected: 03/26/24 04:15    Performed by: [facility] ([id number]) Last Resulted: 03/26/24 04:45   Received From: [facility]  Result Received: 03/26/24 18:03     EXAMINATION: MRI BRAIN WO  AND W CONTRAST   EXAM DATE:  3/19/2024 11:36 PM     CLINICAL HISTORY: left frontal IPH, hx of prostate cancer.     COMPARISONS: Same day CT head and CTA head and neck. MRI brain April 24, 2022     TECHNIQUE: Multisequence, multiplanar MR images of the brain are   provided with and without IV contrast.     _________________________     FINDINGS:     The examination is degraded by patient motion. Within limitations:     Large left frontal intraparenchymal hematoma again noted, not   substantially changed from the same day head CT, allowing for   differences in modality, measuring up to 4.3 cm in maximum dimension.   There is mild surrounding vasogenic edema and trace epidural extension   of hemorrhage along the anterior left frontal convexity. Associated   mass effect resulting in regional sulcal effacement and partial   effacement of left frontal horn is not substantially changed. No   midline shift. Evaluation for an underlying enhancing intracranial   lesion is limited due to the presence of intrinsically T1 hyperintense   blood products; within limitations, no enhancing intracranial   abnormality identified.     There are several punctate foci of susceptibility scattered randomly   throughout the supratentorial compartment, similar to the prior exam,   most consistent with chronic parenchymal microhemorrhages.     Generalized sulcal and ventricular prominence is compatible with   parenchymal volume loss. Patchy areas of hyperintensity on T2-weighted   images are present within the periventricular and subcortical white   matter, a nonspecific finding that likely represents chronic   microvascular ischemic change. Diffusion-weighted imaging reveals no   evidence of an acute ischemic event.      The major intracranial flow voids are preserved.     Normal marrow signal intensity in the calvarium, skull base, and upper   cervical spine. The visualized orbits are grossly unremarkable. The   paranasal sinuses are  clear. The mastoid air cells are clear.     _________________________     IMPRESSION:   1.No significant change in the large left frontal intraparenchymal   hematoma with trace subdural extension and associated mass effect   compared to today's earlier head CT, allowing for differences in   modality. No strong evidence of an underlying enhancing intracranial   lesion identified.   2.Superimposed scattered supratentorial chronic parenchymal   microhemorrhages. In conjunction with the left frontal hematoma, these   findings are likely on the basis of cerebral amyloid angiopathy, less   likely chronic hypertension.   3.Generalized brain atrophy and probable superimposed chronic   microvascular ischemic change.       Assessment & Plan   Assessment / Plan          Active Hospital Problems:  Active Hospital Problems    Diagnosis     **Intracerebral hemorrhage       Status post intraparenchymal hemorrhage-4.3 cm-left frontal lobe with scattered chronic parenchymal microhemorrhages  Amyloid angiopathy  Impaired cognition, impaired mobility, impaired self-care, impaired speech  Hold aspirin 81 mg for 2 weeks from March 19  Hypertension-long-term goal 130/80.  Metoprolol/lisinopril  Hyperlipidemia-simvastatin  Coronary disease status post PCI 2017.  2 weeks from March 19  DVT prophylaxis-SCDs    Now admit for comprehensive acute inpatient rehabilitation .  This would be an interdisciplinary program with physical therapy 1 hour,  occupational therapy 1 hour, and speech therapy 1 hour, 5 days a week.  Rehabilitation nursing for carryover, monitoring of hypertension and neurologic   status, bowel and bladder, and skin  Ongoing physician follow-up.  Weekly team conferences.  Goals are to achieve a level of supervision with  mobility and self-care and improved cognitive linguistic.   Rehabilitation prognosis fair.  Medical prognosis fair.  Estimated length of stay is approximately 2 weeks, but is only an estimation.   The  patient's functional status and clinical status is unchanged from preadmission assessment and the patient continues appropriate for acute inpatient rehabilitation.  Goal is for home with outpatient   therapies.  Barrier to discharge: Impaired cognitive-linguistic mobility self-care- work on speech, gross and fine motor control, visual perceptual/visual spatial skills, transfers, gait, ADLs to overcome.         DVT prophylaxis:  Mechanical DVT prophylaxis orders are present.        CODE STATUS:       Admission Status:  I believe this patient meets inpatient status.    Cesar Dewitt MD

## 2024-03-27 NOTE — PROGRESS NOTES
SECTION GG    Eating Performance: [place] sets up or cleans up; patient completes activity.  [place] assists only prior to or following the activity.    Eating Discharge Goals: [place] sets up or cleans up; patient completes activity.  [place] assists only prior to or following the activity.    Section B. Hearing and Vision  Ability to Hear:  Adequate - no difficulty in normal conversation, social  interaction, listening to TV  Ability to See in Adequate Light:  Adequate - sees fine detail, such as regular  print in newspapers/books    Section B. Health Literacy  Frequency of Needing Assistance Reading:  Sometimes    Section D. Mood  Presence of little interest or pleasure in doing things:   Yes  Frequency of having little interest or pleasure in doing things:   7-11 days  (half or more of the days)  Presence of feeling down, depressed, or hopeless:   Yes  Frequency of feeling down, depressed, or hopeless:   7-11 days (half or more of  the days)   Continue Interview  Presence of trouble falling or staying asleep, or sleeping too much:   Yes  Frequency of trouble falling or staying asleep, or sleeping too much:   Never or  1 day  Presence of feeling tired or having little energy:   Yes  Frequency of feeling tired or having little energy:   Never or 1 day  Presence of poor appetite or overeating:   No  Frequency of poor appetite or overeating:   Never or 1 day  Presence of feeling bad about yourself-or that you are a failure or have let  yourself or your family down:   Yes  Frequency of feeling bad about yourself-or that you are a failure or have let  yourself or your family down:  Never or 1 day  Presence of trouble concentrating on things:   Yes  Frequency of trouble concentrating on things:   Never or 1 day  Presence of Moving/Speaking Slowly or Being Fidgety:   Yes  Frequency of Moving/Speaking Slowly or Being Fidgety:  Never or 1 day  Presence of Thoughts of Self Harm:  Yes  Frequency of Thoughts of Self Harm:    Never or 1 day  Total Severity Score:   4    Section D. Social Isolation  [place] of Feeling Lonely or Isolated:  Rarely    Section J. Health Conditions (Pain Effect on Sleep)  Pain Effect on Sleep:   Does not apply - Patient has not had any pain or hurting  in the past 5 days    Signed by: Yanna Ceballos RN

## 2024-03-27 NOTE — PROGRESS NOTES
Inpatient Rehabilitation Functional Measures Assessment and Plan of Care    Plan of Care  Updated Problems/Interventions  Mobility    [OT] Toilet Transfers(Active)  Current Status(03/27/2024): CGA  Weekly Goal(03/29/2024): Supervision  Discharge Goal: Supervision    [OT] Tub/Shower Transfers(Active)  Current Status(03/27/2024): CGA  Weekly Goal(03/29/2024): Supervision  Discharge Goal: Supervision        Self Care    [OT] Bathing(Active)  Current Status(03/27/2024): CGA shower  Weekly Goal(03/29/2024): Supervision  Discharge Goal: Supervision    [OT] Dressing (Lower)(Active)  Current Status(03/27/2024): CGA  Weekly Goal(03/29/2024): Supervision  Discharge Goal: Supervision    [OT] Dressing (Upper)(Active)  Current Status(03/27/2024): set up  Weekly Goal(03/29/2024): supervision  Discharge Goal: supervision    [OT] Grooming(Active)  Current Status(03/27/2024): set up  Weekly Goal(03/29/2024): supervision  Discharge Goal: supervision    [OT] Toileting(Active)  Current Status(03/27/2024): CGA  Weekly Goal(03/29/2024): Supervision  Discharge Goal: Supervision    Functional Measures  NURA Eating:  Branch  NURA Grooming: Branch  NURA Bathing:  Branch  NURA Upper Body Dressing:  Branch  NURA Lower Body Dressing:  Branch  NURA Toileting:  Branch    NURA Bladder Management  Level of Assistance:  Branch  Frequency/Number of Accidents this Shift:  Branch    NURA Bowel Management  Level of Assistance: Branch  Frequency/Number of Accidents this Shift: Branch    NURA Bed/Chair/Wheelchair Transfer:  Branch  NURA Toilet Transfer:  Branch  NURA Tub/Shower Transfer:  Branch    Previously Documented Mode of Locomotion at Discharge: Field  NURA Expected Mode of Locomotion at Discharge: Branch  NURA Walk/Wheelchair:  Branch  NURA Stairs:  Branch    NURA Comprehension:  Branch  NURA Expression:  Branch  NURA Social Interaction:  Branch  NURA Problem Solving:  Branch  NURA Memory:  Branch    Therapy Mode Minutes  Occupational Therapy: Branch  Physical  Therapy: Branch  Speech Language Pathology:  Branch    Signed by: Genet Redmond OT

## 2024-03-27 NOTE — PROGRESS NOTES
Inpatient Rehabilitation Admission  Section A. Transportation  Issues Due to Lack of Transportation:   No    Signed by: SEYMOUR Lowe

## 2024-03-27 NOTE — PROGRESS NOTES
Inpatient Rehabilitation Plan of Care Note    Plan of Care  Branch    Body Function Structure    [RN] Skin Integrity(Active)  Current Status(03/26/2024): Skin intact  Weekly Goal(04/02/2024): Skin intact  Discharge Goal: No skin breakdown/issues    Performed Intervention(s)  Daily skin inspection  Positioning  Turning      Safety    Performed Intervention(s)  Safety rounds  Bed alarm and/or chair alarm  Falls precautions/protocol      Sphincter Control    Performed Intervention(s)  Encourage fluid intake  Perineal care  Encourage appropriate diet      Psychosocial    Performed Intervention(s)  Verbalizes needs and concerns  Therapeutic environmental set up  Support/peer groups    Signed by: Yanna Ceballos RN

## 2024-03-27 NOTE — PLAN OF CARE
"Goal Outcome Evaluation:              Outcome Evaluation: Speech and language assessment completed this date. The Western Aphasia Battery - Revised (WAB-R) was used as a standardized assessment to evaluate patient's current skills. 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 Receptive language slightly stronger than expressive language. Patient's expressive language characterized by inconsistent semantic jargon, perseveration, paraphasias, and mild-moderate word finding difficulty. Provided incomplete sentences during the picture description task with some grammatical organization. Exhibited multiple paraphasias throughout the task (e.g. \"A woman with a misket\" for \"basket\"). Demonstrated mild-moderate difficulty with confrontational naming and verbal fluency/divergent naming. Patient ~50% accurate during confrontational naming task due to perseveration and paraphasias. For example, patient perseverated on the word \"screwdriver\" x6 after initial presentation. Strengths noted with sentence completion and responsive speech tasks. Auditory comprehension mildly impaired for multi-step directions and complex yes/no questions. Repetition WFL for words and phrases, however, patient unable to repeat sentences of 8+ utterance length. Demonstrated decreased awareness toward current speech/language difficulties. Recommend ongoing speech therapy during inpatient stay to target cognitive-communication skills.      Anticipated Discharge Disposition (SLP): home with 24/7 care, home with OP services    SLP Diagnosis: mild-moderate, aphasia (03/27/24 7661)                   "

## 2024-03-27 NOTE — THERAPY EVALUATION
"Inpatient Rehabilitation - Speech Language Pathology Initial Evaluation    [facility]     Patient Name: Robby Mccloud  : 1940  MRN: [id number]    Today's Date: 3/27/2024                   Admit Date: 3/26/2024       Visit Dx:    No diagnosis found.    Patient Active Problem List   Diagnosis    Intracerebral hemorrhage       Past Medical History:   Diagnosis Date    Cancer     Coronary artery disease     Elevated cholesterol     Hypertension     Stroke        Past Surgical History:   Procedure Laterality Date    ABDOMINAL SURGERY      CARDIAC CATHETERIZATION      COLONOSCOPY      SKIN BIOPSY         SLP Recommendation and Plan    SLP Diagnosis: mild-moderate, aphasia (24)        Rehab Potential/Prognosis: good (24)     Anticipated Discharge Disposition (SLP): home with  care, home with OP services (24)        Predicted Duration Therapy Intervention (Days): until discharge (24)                 Outcome Evaluation: Speech and language assessment completed this date. The Western Aphasia Battery - Revised (WAB-R) was used as a standardized assessment to evaluate patient's current skills. 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 Receptive language slightly stronger than expressive language. Patient's expressive language characterized by inconsistent semantic jargon, perseveration, paraphasias, and mild-moderate word finding difficulty. Provided incomplete sentences during the picture description task with some grammatical organization. Exhibited multiple paraphasias throughout the task (e.g. \"A woman with a misket\" for \"basket\"). Demonstrated mild-moderate difficulty with confrontational naming and verbal fluency/divergent naming. Patient ~50% accurate during confrontational naming task due to perseveration and paraphasias. For example, patient perseverated on the word \"screwdriver\" x6 after initial presentation. Strengths noted with " sentence completion and responsive speech tasks. Auditory comprehension mildly impaired for multi-step directions and complex yes/no questions. Repetition WFL for words and phrases, however, patient unable to repeat sentences of 8+ utterance length. Demonstrated decreased awareness toward current speech/language difficulties. Recommend ongoing speech therapy during inpatient stay to target cognitive-communication skills. (03/27/24 3657)                SLP EVALUATION (Last 72 Hours)       SLP SLC Evaluation       Row Name 03/27/24 9208       Communication Assessment/Intervention    Document Type evaluation  -SR    Subjective Information no complaints  -SR    Patient Observations alert;cooperative;agree to therapy  -SR    Patient Effort good  -SR    Symptoms Noted During/After Treatment none  -SR       General Information    Patient Profile Reviewed yes  -SR    Pertinent History Of Current Problem 83 year old male admitted to acute rehab following hospitalization for ICH  -SR    Precautions/Limitations, Vision corrective lenses needed for reading  -SR    Precautions/Limitations, Hearing WFL;for purposes of eval  -SR    Prior Level of Function-Communication WFL  -SR    Plans/Goals Discussed with patient;family;agreed upon  -SR    Barriers to Rehab none identified  -SR    Patient's Goals for Discharge return to home  -SR    Family Goals for Discharge functional cognition;functional communication  -SR    Standardized Assessment Used Western Aphasia Battery  -SR       Pain    Additional Documentation Pain Scale: Numbers Pre/Post-Treatment (Group)  -SR       Pain Scale: Numbers Pre/Post-Treatment    Pretreatment Pain Rating 0/10 - no pain  -SR    Posttreatment Pain Rating 0/10 - no pain  -SR       Comprehension Assessment/Intervention    Comprehension Assessment/Intervention Auditory Comprehension  -SR       Auditory Comprehension Assessment/Intervention    Auditory Comprehension (Communication) mild impairment  -SR     Able to Identify Objects/Pictures (Communication) familiar objects;body part;WFL  -SR    Answers Questions (Communication) yes/no;wh questions;mild impairment  -SR    Able to Follow Commands (Communication) 2-step;3-step;mild impairment  -SR    Narrative Discourse simple paragraph level;mild impairment  -SR       Expression Assessment/Intervention    Expression Assessment/Intervention verbal expression  -SR       Verbal Expression Assessment/Intervention    Verbal Expression mild impairment;moderate impairment  -SR    Automatic Speech (Communication) response to greeting;alphabet;counting 1-20;days of week;months of year;WFL  -SR    Repetition sentences;mild impairment  -SR    Phrase Completion automatic/predictable;WFL  -SR    Responsive Naming mild impairment  -SR    Confrontational Naming mild impairment;perseverations;semantic paraphasias;phonemic paraphasias  -SR    Spontaneous/Functional Words mild impairment;moderate impairment  -SR    Sentence Formulation mild impairment;moderate impairment  -SR    Conversational Discourse/Fluency mild impairment;moderate impairment  -SR       Oral Motor Structure and Function    Oral Motor Structure and Function WNL  -SR       Oral Musculature and Cranial Nerve Assessment    Oral Motor General Assessment WFL  -SR       Standardized Tests    Formal Speech Language Tests WAB: Western Aphasia Battery  -SR       Spontaneous Speech    Information Content 7  -SR    Fluency 7  -SR    Spontaneous Speech Total 14  -SR       Comprehension    Yes/No Questions 48  -SR    Auditory Word Recongition 50  -SR    Sequential Commands 50  -SR    Comprehension Total 148  -SR       Repetition    Repetition Total 78  -SR       Naming    Object 32  -SR    Word Fluency 4  -SR    Sentence Completion 10  -SR    Responsive Speech 9  -SR    Naming Total 55  -SR       Aphasia    Aphasia Quotient 69.4  -SR    Aphasia Severity Moderate (51-75)  -SR    Type of Aphasia Anomic  -SR       Cortical    WAB  "Comments Speech and language assessment completed this date. The Western Aphasia Battery - Revised (WAB-R) was used as a standardized assessment to evaluate patient's current skills. 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 Receptive language slightly stronger than expressive language. Patient's expressive language characterized by inconsistent semantic jargon, perseveration, paraphasias, and mild-moderate word finding difficulty. Provided incomplete sentences during the picture description task with some grammatical organization. Exhibited multiple paraphasias throughout the task (e.g. \"A woman with a misket\" for \"basket\"). Demonstrated mild-moderate difficulty with confrontational naming and verbal fluency/divergent naming. Patient ~50% accurate during confrontational naming task due to perseveration and paraphasias. For example, patient perseverated on the word \"screwdriver\" x6 after initial presentation. Strengths noted with sentence completion and responsive speech tasks. Auditory comprehension mildly impaired for multi-step directions and complex yes/no questions. Repetition WFL for words and phrases, however, patient unable to repeat sentences of 8+ utterance length. Demonstrated decreased awareness toward current speech/language difficulties. Recommend ongoing speech therapy during inpatient stay to target cognitive-communication skills.  -SR       SLP Evaluation Clinical Impressions    SLP Diagnosis mild-moderate;aphasia  -SR    Rehab Potential/Prognosis good  -SR    SLC Criteria for Skilled Therapy Interventions Met yes  -SR    Functional Impact needs 24 hour supervision;difficulty communicating wants, needs;difficulty communicating in an emergency;difficulty in expressing complex messages;Poor Judgement  -SR       Recommendations    Therapy Frequency (SLP SLC) 5 days per week  -SR    Predicted Duration Therapy Intervention (Days) until discharge  -SR    Anticipated " Discharge Disposition (SLP) home with 24/7 care;home with OP services  -SR       Communication Treatment Objective and Progress Goals (SLP)    Auditory Comprehension Treatment Objectives Auditory Comprehension Treatment Objectives (Group)  -SR    Verbal Expression Treatment Objectives Verbal Expression Treatment Objectives (Group)  -SR       Auditory Comprehension Treatment Objectives    Comprehend Questions Selection comprehend questions, SLP goal 1  -SR    Follow Directions Selection follow directions, SLP goal 1  -SR       Comprehend Questions Goal 1 (SLP)    Improve Ability to Comprehend Questions Goal 1 (SLP) complex yes/no questions;complex wh questions;80%;with minimal cues (75-90%)  -SR    Time Frame (Comprehend Questions Goal 1, SLP) by discharge  -SR       Follow Directions Goal 2 (SLP)    Improve Ability to Follow Directions Goal 1 (SLP) 2 step commands;80%;with minimal cues (75-90%)  -SR    Time Frame (Follow Directions Goal 1, SLP) by discharge  -SR       Verbal Expression Treatment Objectives    Word Retrieval Skills Selection word retrieval, SLP goal 1  -SR    Phrase and Sentence Level Response Selection phrase and sentence level response, SLP goal 1  -SR       Word Retrieval Skills Goal 1 (SLP)    Improve Word Retrieval Skills By Goal 1 (SLP) completing functional word finding tasks;80%;with minimal cues (75-90%)  -SR    Time Frame (Word Retrieval Goal 1, SLP) by discharge  -SR       Ability to Construct Phrase and Sentence Level Response Goal 1 (SLP)    Improve Ability to Construct Phrase and Sentence Level Responses By Goal 1 (SLP) constructing a sentence with a key word;80%;with minimal cues (75-90%)  -SR    Time Frame (Phrase and Sentence Level Response Goal 1, SLP) by discharge  -SR              User Key  (r) = Recorded By, (t) = Taken By, (c) = Cosigned By      Initials Name Effective Dates    SR Iveth Dillard, SLP 01/05/24 -                        EDUCATION    The patient has been educated  in the following areas:       Cognitive Impairment Communication Impairment.             SLP GOALS       Row Name 03/27/24 0930             Comprehend Questions Goal 1 (SLP)    Improve Ability to Comprehend Questions Goal 1 (SLP) complex yes/no questions;complex wh questions;80%;with minimal cues (75-90%)  -SR      Time Frame (Comprehend Questions Goal 1, SLP) by discharge  -SR         Follow Directions Goal 2 (SLP)    Improve Ability to Follow Directions Goal 1 (SLP) 2 step commands;80%;with minimal cues (75-90%)  -SR      Time Frame (Follow Directions Goal 1, SLP) by discharge  -SR         Word Retrieval Skills Goal 1 (SLP)    Improve Word Retrieval Skills By Goal 1 (SLP) completing functional word finding tasks;80%;with minimal cues (75-90%)  -SR      Time Frame (Word Retrieval Goal 1, SLP) by discharge  -SR         Ability to Construct Phrase and Sentence Level Response Goal 1 (SLP)    Improve Ability to Construct Phrase and Sentence Level Responses By Goal 1 (SLP) constructing a sentence with a key word;80%;with minimal cues (75-90%)  -SR      Time Frame (Phrase and Sentence Level Response Goal 1, SLP) by discharge  -SR                User Key  (r) = Recorded By, (t) = Taken By, (c) = Cosigned By      Initials Name Provider Type    SR Iveth Dillard SLP Speech and Language Pathologist                                Time Calculation:        Time Calculation- SLP       Row Name 03/27/24 1537             Time Calculation- SLP    SLP Start Time 0930  -SR      SLP Stop Time 1030  -SR      SLP Time Calculation (min) 60 min  -SR      Total Timed Code Minutes-  minute(s)  scoring/documentation ([id number] and [id number])  -SR      SLP Received On 03/27/24  -SR                User Key  (r) = Recorded By, (t) = Taken By, (c) = Cosigned By      Initials Name Provider Type    SR Iveth Dillard, SLP Speech and Language Pathologist                      Therapy Charges for Today       Code Description Service Date  Service Provider Modifiers Qty    02526322576 HC ST ASSESSMENT OF APHASIA PER HOUR 3/27/2024 Iveth Dillard, SLP GN 2                             JS Orosco  3/27/2024

## 2024-03-27 NOTE — PROGRESS NOTES
Inpatient Rehabilitation Plan of Care Note    Plan of Care  Care Plan Reviewed - Updates as Follows    Safety    [RN] Potential for Injury(Active)  Current Status(03/27/2024): Uses call light appropriately  Weekly Goal(04/02/2024): Cue to use call bell  Discharge Goal: Family /caregivers regarding safety precautions and need for  close supervision    Performed Intervention(s)  Safety rounds  Bed alarm and/or chair alarm  Falls precautions/protocol      Sphincter Control    [RN] Bladder Management(Active)  Current Status(03/27/2024): Continent 100%  Weekly Goal(04/02/2024): Continent 100%  Discharge Goal: Continent 100%    [RN] Bowel Management(Active)  Current Status(03/27/2024): Continent 100%  Weekly Goal(04/02/2024): Continent 100%  Discharge Goal: Continent 100%    Performed Intervention(s)  Encourage fluid intake  Perineal care  Encourage appropriate diet      Psychosocial    [RN] Behavior(Active)  Current Status(03/27/2024): Supportive family  Weekly Goal(04/02/2024): Alow opportunity to express concerns regarding current  status  Discharge Goal: Adequate coping regarding life changes with ongoing support    Performed Intervention(s)  Verbalizes needs and concerns  Therapeutic environmental set up  Support/peer groups      Body Function Structure    [RN] Skin Integrity(Active)  Current Status(03/27/2024): Skin intact  Weekly Goal(04/02/2024): Skin intact  Discharge Goal: No skin breakdown/issues    Performed Intervention(s)  Daily skin inspection  Positioning  Turning    Signed by: Sadaf Petersen RN

## 2024-03-27 NOTE — PROGRESS NOTES
Inpatient Rehabilitation Plan of Care Note    Plan of Care  Updated Problems/Interventions  Communication    [ST] Expression(Active)  Current Status(03/27/2024): Moderate anomic aphasia (per standardized  assessment). Receptive language slightly stronger than expressive language.  Expressive language characterized by inconsistent semantic jargon,  perseveration, paraphasias, and mild-moderate word finding difficulty.  Weekly Goal(04/03/2024): Patient will express wants/needs using communication  strategies and/or supports given MOD cues.  Discharge Goal: Patient will express wants/needs using communication strategies  and/or supports independently.    Signed by: Iveth Dillard, SLP

## 2024-03-27 NOTE — PROGRESS NOTES
"Section B. Hearing, Speech, Vision: Expression of Ideas and Wants: Exhibits some  difficulty with expressing needs and ideas (e.g., some words or finishing  thoughts) or speech is not clear.  Understanding Verbal and Non-Verbal Content: Usually Understands: Understands  most conversations, but misses some part/intent of message. Requires cues at  times to understand.    Section C. Cognitive Patterns: Brief Interview for Mental Status (BIMS) was  conducted.  Repetition of Three Words: Two words  Able to report correct year: Correct  Able to report correct month: Accurate within 5 days  Able to report correct day of the week: Incorrect or no answer  Able to recall \"sock\": Yes, no cue required  Able to recall \"blue\": No, could not recall  Able to recall \"bed\": No, could not recall    BIMS SUMMARY SCORE: 9 Moderately impaired Patient was able to complete the Brief  Interview for Mental Status    Section C. Signs and Symptoms of Delirium (from CAM): Evidence of Acute Change  in Mental Status:   No  Inattention: Behavior not present  Thinking Disorganized or Incoherent:   Behavior not present  Altered Level of Consciousness:   Behavior not present    Signed by: Iveth Dillard SLP    "

## 2024-03-28 PROCEDURE — 97530 THERAPEUTIC ACTIVITIES: CPT

## 2024-03-28 PROCEDURE — 97535 SELF CARE MNGMENT TRAINING: CPT

## 2024-03-28 PROCEDURE — 97110 THERAPEUTIC EXERCISES: CPT

## 2024-03-28 PROCEDURE — 97116 GAIT TRAINING THERAPY: CPT

## 2024-03-28 PROCEDURE — 97112 NEUROMUSCULAR REEDUCATION: CPT

## 2024-03-28 PROCEDURE — 92507 TX SP LANG VOICE COMM INDIV: CPT

## 2024-03-28 RX ADMIN — Medication 20 MG: at 21:00

## 2024-03-28 RX ADMIN — LISINOPRIL 10 MG: 10 TABLET ORAL at 09:59

## 2024-03-28 RX ADMIN — METOPROLOL TARTRATE 25 MG: 25 TABLET, FILM COATED ORAL at 20:58

## 2024-03-28 RX ADMIN — METOPROLOL TARTRATE 25 MG: 25 TABLET, FILM COATED ORAL at 09:59

## 2024-03-28 NOTE — THERAPY TREATMENT NOTE
Inpatient Rehabilitation - Physical Therapy Treatment Note       [facility]     Patient Name: Robby Mccloud  : 1940  MRN: [id number]    Today's Date: 3/28/2024                    Admit Date: 3/26/2024      Visit Dx:   No diagnosis found.    Patient Active Problem List   Diagnosis    Intracerebral hemorrhage       Past Medical History:   Diagnosis Date    Cancer     Coronary artery disease     Elevated cholesterol     Hypertension     Stroke        Past Surgical History:   Procedure Laterality Date    ABDOMINAL SURGERY      CARDIAC CATHETERIZATION      COLONOSCOPY      SKIN BIOPSY         PT ASSESSMENT (Last 12 Hours)       IRF PT Evaluation and Treatment       Row Name 24 1200          PT Time and Intention    Document Type daily treatment  -MD SAVANNAH BISWAS (r t) (c)     Mode of Treatment physical therapy  -MD SAVANNAH BISWAS (r t) (c)     Patient/Family/Caregiver Comments/Observations pt in chair in gym with daughter, no distress.  -MD SAVANNAH Cary (r t)c)       Row Name 24 1200          General Information    Patient Profile Reviewed yes  -MD SAVANNAH BISWAS (r t) (c)     Existing Precautions/Restrictions fall  -MD SAVANNAH BISWAS (r t) (c)     Limitations/Impairments safety/cognitive  -MD SAVANNAH BISWAS (r t) (c)     Comment, General Information blue arm band  -MD SAVANNAH dillon (r)) MD (c)       Row Name 24 1200          Pain Assessment    Pretreatment Pain Rating 0/10 - no pain  -MD SAVANNAH BISWAS (r t) (c)     Posttreatment Pain Rating 0/10 - no pain  -MD SAVANNAH BISWAS (r t) (c)       Row Name 24 1200          Cognition/Psychosocial    Affect/Mental Status (Cognition) WFL  -MD SAVANNAH BISWAS (r t) (c)     Orientation Status (Cognition) oriented to;person;place;situation  -MD SAVANNAH BISWAS (r t) (c)     Follows Commands (Cognition) follows one-step commands;75-90% accuracy;increased processing time needed;verbal cues/prompting required;repetition of directions required  -MD SAVANNAH dillon (r)) MD (c)     Personal Safety Interventions fall prevention program  maintained;gait belt;supervised activity  -MD (r) SAVANNAH (t) MD (c)     Cognitive Function attention deficit;safety deficit  -MD (r) SAVANNAH (t) MD (c)     Attention Deficit (Cognition) minimal deficit;focused/sustained attention  -MD (r) SAVANNAH (t) MD (c)       Row Name 03/28/24 1200          Bed Mobility    Supine-Sit Jenner (Bed Mobility) standby assist  -MD (r) SAVANNAH (cherrie) MD (c)       Row Name 03/28/24 1200          Sit-Stand Transfer    Sit-Stand Jenner (Transfers) standby assist  -MD (radha) SAVANNAH (cherrie) MD (c)     Comment, (Sit-Stand Transfer) pt performed 2x10 STS with no UE support and SBA with VC to control his descent.  -MD (radha) SAVANNAH (cherrie) MD (c)       Row Name 03/28/24 1200          Stand-Sit Transfer    Stand-Sit Jenner (Transfers) standby assist  -MD (radha) SAVANNAH (cherrie) MD (c)       Row Name 03/28/24 1200          Gait/Stairs (Locomotion)    Jenner Level (Gait) standby assist  -MD (radha) SAVANNAH (cherrie) MD (c)     Assistive Device (Gait) --  no AD  -MD (r) SAVANNAH (t) MD (c)     Patient was able to Ambulate yes  -MD (radha) SAVANNAH (t) MD (c)     Distance in Feet (Gait) --  250'x3 (from/to room), 160'x1  -MD (radha) SAVANNAH (t) MD (c)     Pattern (Gait) step-through  -MD (radha) SAVANNAH (t) MD (c)     Deviations/Abnormal Patterns (Gait) base of support, narrow;bilateral deviations  -MD (radha) SAVANNAH (t) MD (c)     Bilateral Gait Deviations forward flexed posture  -MD (r) SAVANNAH (t) MD (c)     Jenner Level (Stairs) verbal cues;contact guard  -MD (radha) SAVANNAH (cherrie) MD (c)     Handrail Location (Stairs) none  -MD (radha) SAVANNAH (cherrie) MD (c)     Number of Steps (Stairs) 8  -MD (radha) SAVANNAH (cherrie) MD (c)     Ascending Technique (Stairs) step-over-step  -MD (radha) SAVANNAH (cherrie) MD (c)     Descending Technique (Stairs) step-over-step  -MD (r) SAVANNAH (t) MD (c)     Stairs Assessment/Intervention pt displayed difficulty understanding directions for safe stair ambulation and would go up the 6 inch steps and down the 2 inch steps despite instructions saying otherwise. Pt also ambulated the stairs without using  handrails but was able to do so safely with CGA.  -MD SAVANNAH dillon (r)) MD yoo)       Row Name 03/28/24 1200          Balance    Static Standing Balance contact guard;minimal assist  -MD SAVANNAH yoo (r t))     Dynamic Standing Balance contact guard  -MD SAVANNAH yoo (r t))     Comment, Balance Pt performed 2x60 seconds standing on airex pad with 30s eyes open 30s eyes closed requiring CGA and no UE support. Pt also performed 4x30 seconds on balance disc pads by narciso bars with no UE support and min A during the first two sets, CGA during the last two sets as the pts ability to maintain balance improved. Pt performed throwing approximately 8 bean bags to target x2 on airex pad with CGA.  -MD SAVANNAH dillon (r)) MD yoo)       Row Name 03/28/24 1200          Motor Skills    Therapeutic Exercise hip  -MD SAVANNAH yoo (r t))       Row Name 03/28/24 1200          Hip (Therapeutic Exercise)    Hip (Therapeutic Exercise) strengthening exercise  -MD SAVANNAH yoo (r t))     Hip Strengthening (Therapeutic Exercise) bilateral;standing;flexion;extension;aBduction;resistance band;red;10 repetitions;2 sets  max VC to perform exercises properly  -MD SAVANNAH dillon (r)) MD yoo)       Row Name 03/28/24 1200          Positioning and Restraints    Pre-Treatment Position sitting in chair/recliner  -MD SAVANNAH yoo (r t))     Post Treatment Position bed  -MD SAVANNAH BISWAS (r t) (gary)     In Bed supine;with family/caregiver;call light within reach;encouraged to call for assist;exit alarm on  in am and pm  -MD SAVANNAH dillon (r)) MD (gary)               User Key  (r) = Recorded By, (t) = Taken By, (c) = Cosigned By      Initials Name Provider Type    Tiffanie Najera, PT Physical Therapist    Elkin Morgan, PT Student PT Student                     Physical Therapy Education       Title: PT OT SLP Therapies (In Progress)       Topic: Physical Therapy (In Progress)       Point: Mobility training (Not Started)       Learner Progress:  Not documented in this visit.              Point: Home exercise  program (Not Started)       Learner Progress:  Not documented in this visit.              Point: Body mechanics (Not Started)       Learner Progress:  Not documented in this visit.              Point: Precautions (Done)       Learning Progress Summary             Patient Acceptance, E, VU by JT at 3/28/2024 1453    Acceptance, E, VU by JT at 3/27/2024 1429                                         User Key       Initials Effective Dates Name Provider Type Discipline     01/24/24 -  Elkin Caraballo, PT Student PT Student PT                    PT Recommendation and Plan    Frequency of Treatment (PT): 5 times per week, 60 minutes per session             Outcome Measures       Row Name 03/27/24 1400             Functional Gait Assessment (FGA)    Gait Level Surface 3  -DP (r) JT (t) DP (c)      Change in Gait Speed 2  -DP (r) JT (t) DP (c)      Gait with Horizontal Head Turns 0  -DP (r) JT (t) DP (c)      Gait with Vertical Head Turns 3  -DP (r) JT (t) DP (c)      Gait and Pivot Turn 3  -DP (r) JT (t) DP (c)      Step Over Obstacle 0  -DP (r) JT (t) DP (c)      Gait with Narrow Base of Support 2  -DP (r) JT (t) DP (c)      Gait with Eyes Closed 3  -DP (r) JT (t) DP (c)      Ambulating Backwards 3  -DP (r) JT (t) DP (c)      Steps 2  -DP (r) JT (t) DP (c)      FGA Total Score 21  -DP (r) JT (t)      FGA Comments pt displayed difficulty understanding instructions during assessment and results of this assessment may have been influenced by potential cognitive impairments.  -DP (r) JT (t) DP (c)         Functional Assessment    Outcome Measure Options FGA (Functional Gait Assessment)  -DP (r) JT (t) DP (c)                User Key  (r) = Recorded By, (t) = Taken By, (c) = Cosigned By      Initials Name Provider Type    DP Jace Arizmendi, PT Physical Therapist    Elkin Morgan, PT Student PT Student                         Time Calculation:      PT Charges       Row Name 03/28/24 1453 03/28/24 1452          Time Calculation     Start Time 1230  -MD SAVANNAH (r) (t) MD (c) 1100  -MD SAVANNAH (r) (t) MD (c)     Stop Time 1300  -MD SAVANNAH (r) (t) MD (c) 1130  -MD SAVANNAH (r) (cherrie) MD (gary)     Time Calculation (min) 30 min  -MD SAVANNAH (r) (t) 30 min  -MD SAVANNAH (r) (cherrie)     PT Received On -- 03/28/24  -MD SAVANNAH dillon (r)) MD yoo)     PT - Next Appointment -- 03/29/24  -MD SAVANNAH dillon (r)) MD yoo)               User Key  (r) = Recorded By, (t) = Taken By, (c) = Cosigned By      Initials Name Provider Type    Tiffanie Najera, PT Physical Therapist    Elkin Morgan, PT Student PT Student                    Therapy Charges for Today       Code Description Service Date Service Provider Modifiers Qty    02213828868 HC PT EVAL LOW COMPLEXITY 3 3/27/2024 Elkin Caraballo, PT Student GP 1    67397748072 HC GAIT TRAINING EA 15 MIN 3/27/2024 Elkin Caraballo, PT Student GP 1    53374899169 HC GAIT TRAINING EA 15 MIN 3/27/2024 Elkin Caraballo, PT Student GP 1    25323144509 HC PT THERAPEUTIC ACT EA 15 MIN 3/27/2024 Elkin Caraballo, PT Student GP 1    81579964140 HC PT THERAPEUTIC ACT EA 15 MIN 3/28/2024 Elkin Caraballo, PT Student GP 1    16740818123 HC GAIT TRAINING EA 15 MIN 3/28/2024 Elkin Caraballo, PT Student GP 1    26414769969 HC PT THER PROC EA 15 MIN 3/28/2024 Elkin Caraballo, PT Student GP 1    12265081874 HC PT THERAPEUTIC ACT EA 15 MIN 3/28/2024 Elkin Caraballo, PT Student GP 1              PT G-Codes  Outcome Measure Options: FGA (Functional Gait Assessment)  AM-PAC 6 Clicks Score (PT): 17  FGA Total Score: 21      Elkin Caraballo PT Student  3/28/2024

## 2024-03-28 NOTE — PLAN OF CARE
Goal Outcome Evaluation:  Plan of Care Reviewed With: patient           Outcome Evaluation: Pt A&O X3-4.Has word finding difficulties.Has subtle facial droop.Assist x1.Wife by bedside HS.VSS.

## 2024-03-28 NOTE — PROGRESS NOTES
Case Management  Inpatient Rehabilitation Team Conference    Conference Date/Time: 3/28/2024 7:42:19 AM    Team Conference Attendees:  MD Wandy Celis   Tiffanie Brandon, PT  AIDE Ramos   Reba RAJAT Rec Therapy  AMPARO Rollins RN Lauren Moss, Psychologist    Demographics            Age: 83Y            Gender: Male    Admission Date: 3/26/2024 5:18:00 PM  Rehabilitation Diagnosis:  intraparenchymal hemorrhage-4.3 cm-left frontal lobe  with scattered chronic parenchymal microhemorrhages  Amyloid angiopathy  Past Medical History: Past Medical History:  Diagnosis Date  ? Cancer  ? Coronary artery disease  ? Elevated cholesterol  ? Hypertension  ? Stroke      Past Medical History:  Past Medical History:  Diagnosis Date  ? Arthritis  ? Colon polyp  ? Coronary artery disease  ? Hyperlipidemia  ? Hypertension  ? Myocardial infarction  ? Prostate cancer 3/27/2018  ? Prostate cancer, BRCA2 positive  prostate, 2007  ? Skin cancer  2020  ? 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 COLONOSCOPY  benign  ? CORONARY ANGIOPLASTY WITH STENT PLACEMENT 03/08/2017  drug-eluting stent to RCA  ? PROSTATE SURGERY 1/07  radical    Surgical History  Past Surgical History:  Procedure Laterality Date  ? ABDOMINAL SURGERY  ? CARDIAC CATHETERIZATION  ? COLONOSCOPY  ? SKIN BIOPSY      Plan of Care  Anticipated Discharge Date/Estimated Length of Stay: 2 weeks  Anticipated Discharge Destination: Community discharge with assistance  Discharge Plan : Patient lives with wife in 2 story home with bedroom/bath on  first floor. One step entry  D/C plan is home with wife. Intermittent assist from adult children who live  local.  Medical Necessity Expected Level Rationale: Goals are to achieve a level of  supervision with  mobility and self-care and improved cognitive linguistic.  Rehabilitation prognosis fair.  Medical  prognosis fair.  Intensity and Duration: an average of 3 hours/5 days per week  Medical Supervision and 24 Hour Rehab Nursing: x  Physical Therapy: x  PT Intensity/Duration: 1 hour / day, 5 days / week, for approximately 2 weeks  Occupational Therapy: x  OT Intensity/Duration: 1 hour / day, 5 days / week, for approximately 2 weeks  Speech and Language Therapy: x  SLP Intensity/Duration: 1 hour / day, 5 days / week, for approximately 2 weeks  Social Work: x  Therapeutic Recreation: x  Psychology: x  Registered Dietician: x  Updated (if changes indicated)    Anticipated Discharge Date/Estimated Length of Stay:   ELOS: 3/30    Based on the patient's medical and functional status, their prognosis and  expected level of functional improvement is: Goals are to achieve a level of  supervision with  mobility and self-care and improved cognitive linguistic.  Rehabilitation prognosis fair.  Medical prognosis fair.      Interdisciplinary Problem/Goals/Status    All Rehab Problems:  Safety    [RN] Potential for Injury(Active)  Current Status(03/28/2024): Uses call light appropriately  Weekly Goal(04/02/2024): Cue to use call bell  Discharge Goal: Family /caregivers regarding safety precautions and need for  close supervision        Sphincter Control    [RN] Bladder Management(Active)  Current Status(03/28/2024): Continent 100%  Weekly Goal(04/02/2024): Continent 100%  Discharge Goal: Continent 100%    [RN] Bowel Management(Active)  Current Status(03/28/2024): Continent 100%  Weekly Goal(04/02/2024): Continent 100%  Discharge Goal: Continent 100%        Psychosocial    [RN] Behavior(Active)  Current Status(03/28/2024): Supportive family  Weekly Goal(04/02/2024): Alow opportunity to express concerns regarding current  status  Discharge Goal: Adequate coping regarding life changes with ongoing support        Body Function Structure    [RN] Skin Integrity(Active)  Current Status(03/28/2024): Skin intact  Weekly Goal(04/02/2024):  Skin intact  Discharge Goal: No skin breakdown/issues        Mobility    [PT] Bed/Chair/Wheelchair(Active)  Current Status(03/27/2024): CGA no AD  Weekly Goal(04/03/2024): SBA no AD  Discharge Goal: SBA no AD    [PT] Walk(Active)  Current Status(03/27/2024): 150' CGA no AD  Weekly Goal(04/03/2024): BR w nsg  Discharge Goal: 150' SBA no AD    [OT] Toilet Transfers(Active)  Current Status(03/27/2024): CGA  Weekly Goal(03/29/2024): Supervision  Discharge Goal: Supervision    [OT] Tub/Shower Transfers(Active)  Current Status(03/27/2024): CGA  Weekly Goal(03/29/2024): Supervision  Discharge Goal: Supervision    [PT] Stairs(Active)  Current Status(03/27/2024): 12 stairs CGA  Weekly Goal(04/03/2024): 12 stairs SBA  Discharge Goal: 12 stairs SBA        Self Care    [OT] Bathing(Active)  Current Status(03/27/2024): CGA shower  Weekly Goal(03/29/2024): Supervision  Discharge Goal: Supervision    [OT] Dressing (Lower)(Active)  Current Status(03/27/2024): CGA  Weekly Goal(03/29/2024): Supervision  Discharge Goal: Supervision    [OT] Dressing (Upper)(Active)  Current Status(03/27/2024): set up  Weekly Goal(03/29/2024): supervision  Discharge Goal: supervision    [OT] Grooming(Active)  Current Status(03/27/2024): set up  Weekly Goal(03/29/2024): supervision  Discharge Goal: supervision    [OT] Toileting(Active)  Current Status(03/27/2024): CGA  Weekly Goal(03/29/2024): Supervision  Discharge Goal: Supervision        Communication    [ST] Expression(Active)  Current Status(03/27/2024): Moderate anomic aphasia (per standardized  assessment). Receptive language slightly stronger than expressive language.  Expressive language characterized by inconsistent semantic jargon,  perseveration, paraphasias, and mild-moderate word finding difficulty.  Weekly Goal(04/03/2024): Patient will express wants/needs using communication  strategies and/or supports given MOD cues.  Discharge Goal: Patient will express wants/needs using communication  strategies  and/or supports independently.        Comments: 3/28 CG no AD with transfers, can do 12 stairs CG, amb 150' CG no AD.  CG toilet and shower transfer.  CG bathing, CG shower, CG LBD.  Mod anomic  aphasia.  Cont bowel and bladder.    Signed by: Patrica Mccloud RN    Physician CoSigned By: Cesar Dewitt 03/28/2024 14:15:44

## 2024-03-28 NOTE — PROGRESS NOTES
Reviewed team conference report regarding current status, d/c goals, and d/c date set for Saturday, 3/30, with patient, wife, son, Chris, and daughter. They are agreeable to plan. Discussed recommendation to continue with outpatient therapies. Wife stated she had been contacted by [facility] after he d/c from [facility] so was aware they had referral. Contacted [facility] and scheduled patient to start ST on Monday, 4/1. They will start patient for PT and OT on Monday, 4/8. Discussed and gave schedule that was faxed from [facility] to patient and daughter.

## 2024-03-28 NOTE — PROGRESS NOTES
Inpatient Rehabilitation Plan of Care Note    Plan of Care  Care Plan Reviewed - No updates at this time.    Safety    Performed Intervention(s)  Safety rounds  Bed alarm and/or chair alarm  Falls precautions/protocol      Sphincter Control    Performed Intervention(s)  Encourage fluid intake  Perineal care  Encourage appropriate diet      Psychosocial    Performed Intervention(s)  Verbalizes needs and concerns  Therapeutic environmental set up  Support/peer groups      Body Function Structure    Performed Intervention(s)  Daily skin inspection  Positioning  Turning    Signed by: Greyson Peraza RN

## 2024-03-28 NOTE — PROGRESS NOTES
Case Management  Inpatient Rehabilitation Plan of Care and Discharge Plan Note    Rehabilitation Diagnosis:  intraparenchymal hemorrhage-4.3 cm-left frontal lobe  with scattered chronic parenchymal microhemorrhages  Amyloid angiopathy  Date of Onset:  03/19/2024    Medical Summary:  Patient is an 83-year-old male transferred from [facility] after intraparenchymal hemorrhage.  He initially presented  on March 19, 2024 with altered mental status, word finding difficulties.  He was  on aspirin 81 mg prior to admission.  Right frontal lobe intraparenchymal  hemorrhage on admission CT of the head.  Neurosurgery and neurocritical care  were consulted.  Was on a Cardene drip for uncontrolled hypertension during his  initial time in the ICU.  Subsequent MRI of the brain with and without con trans  revealed no significant change in large frontal intraparenchymal hemorrhage.  In  addition there were noted to be superimposed scattered supratentorial chronic  parenchymal microhemorrhages which in conjunction with a left frontal hematoma  led to suspicion of cerebral aneurysm myeloid angiopathy  He is still on aspirin for 2 weeks from the date of the initial hemorrhage per  neurosurgery.    Functionally, reading comprehension yes/no questions 2/5 with 5/5 with max cues.    Reading 8 errors with phonemic paraphasias, meeting words.  Follows one-step  directions 100%.  Picture description 70% with perseveration and phonemic  paraphasias.  Dynamic standing balance fair minus.  Contact-guard to min assist for balance  activities.  Ran into a door frame on the right side returning to his room.  Decreased visual scanning during task.  Ambulated 30 feet x 3 minimal  contact-guard assist.  Significant difficulty following commands and performing  cognitive task while actively moving-difficulty with dual tasking  Past Medical History: Past Medical History:  Diagnosis Date  ? Cancer  ? Coronary artery disease  ? Elevated  cholesterol  ? Hypertension  ? Stroke      Past Medical History:  Past Medical History:  Diagnosis Date  ? Arthritis  ? Colon polyp  ? Coronary artery disease  ? Hyperlipidemia  ? Hypertension  ? Myocardial infarction  ? Prostate cancer 3/27/2018  ? Prostate cancer, BRCA2 positive  prostate, 2007  ? Skin cancer  2020  ? 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 COLONOSCOPY  benign  ? CORONARY ANGIOPLASTY WITH STENT PLACEMENT 03/08/2017  drug-eluting stent to RCA  ? PROSTATE SURGERY 1/07  radical    Surgical History  Past Surgical History:  Procedure Laterality Date  ? ABDOMINAL SURGERY  ? CARDIAC CATHETERIZATION  ? COLONOSCOPY  ? SKIN BIOPSY    Plan of Care  Updated Problems/Interventions  Field    Expected Intensity:  Average of 3 hours of therapy 5 days/week.  Interdisciplinary Team:  Interdisciplinary Team: Medical Supervision and 24 Hour Rehabilitation Nursing.,  Physical Therapy:, Occupational Therapy:, Speech and Language Therapy:, Social  Work, Therapeutic Recreation., Psychology., Registered Dietitian.  Physical Therapy Intensity/Duration: 1 hour / day, 5 days / week, for  approximately 2 weeks  Occupational Therapy Intensity/Duration: 1 hour / day, 5 days / week, for  approximately 2 weeks  Speech Language Pathology  Intensity/Duration: 1 hour / day, 5 days / week, for  approximately 2 weeks  Estimated Length of Stay/Anticipated Discharge Date: 2 weeks  Anticipated Discharge Destination:  Anticipated discharge destination from inpatient rehabilitation is community  discharge with assistance. Patient lives with wife in 2 story home with  bedroom/bath on first floor. One step entry  D/C plan is home with wife. Intermittent assist from adult children who live  local.      Based on the patient's medical and functional status, their prognosis and  expected level of functional improvement is:  Goals are to achieve a level  of  supervision with  mobility and self-care and improved cognitive linguistic.  Rehabilitation prognosis fair.  Medical prognosis fair.    Signed by: Patrica Mccloud RN

## 2024-03-28 NOTE — PROGRESS NOTES
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      Subjective     Patient reports he is tolerating therapies.  No headache.  Speech is better today.  Happy with his progress.  Reviewed plans for anticipated discharge home in a couple of days    Objective     Vitals:    03/28/24 0602   BP: 144/85   Pulse: 70   Resp: 18   Temp: 98.6 °F (37 °C)   SpO2: 95%       Intake/Output Summary (Last 24 hours) at 3/28/2024 0744  Last data filed at 3/27/2024 1154  Gross per 24 hour   Intake 240 ml   Output --   Net 240 ml     PHYSICAL EXAM:     MENTAL STATUS -  AWAKE / ALERT  HEENT- NCAT, PUPILS EQUALLY ROUND, SCLERAE ANICTERIC, CONJUNCTIVAE PINK, OP MOIST, NO JVD, EARS UNREMARKABLE EXTERNALLY  LUNGS - CTA, NO WHEEZES, RALES OR RHONCHI  HEART- RRR, NO RUB, MURMUR, OR GALLOP  ABD - NORMOACTIVE BOWEL SOUNDS, SOFT, NT. NO HEPATOSPLENOMEGALY APPRECIATED  EXT - NO EDEMA OR CYANOSIS  NEURO -expressive language deficits-improved, less paraphasias, word finding difficulties.    Follows single step commands.      Face symmetric.  No dysarthria.  MOTOR EXAM - RUE/RLE 5/5. LUE/LLE 5/5.        MEDICATIONS  Scheduled Meds:[START ON 4/3/2024] aspirin, 81 mg, Oral, Q24H  lisinopril, 10 mg, Oral, Q24H  metoprolol tartrate, 25 mg, Oral, Q12H  simvastatin, 20 mg, Oral, Nightly      Continuous Infusions:   PRN Meds:.  acetaminophen      RESULTS  Glucose   Date/Time Value Ref Range Status   03/26/2024 0624 105 71 - 139 mg/dL Final   03/25/2024 2020 136 71 - 139 mg/dL Final     Comment:     UN   03/25/2024 1712 95 71 - 139 mg/dL Final   03/25/2024 1059  182 (H) 71 - 139 mg/dL Final     Comment:     H     Results from last 7 days   Lab Units 03/26/24  0415 03/25/24  0337 03/24/24  0419   WBC 10*3/uL 7.02 10.98 8.48   HEMOGLOBIN g/dL 13.1* 13.3* 14.7   HEMATOCRIT % 39.7* 39.8* 44.1   PLATELETS 10*3/uL 217 233 228     Results from last 7 days   Lab Units 03/22/24  1329   POTASSIUM mmol/L 3.8           ASSESSMENT and PLAN    Intracerebral hemorrhage      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  Metoprolol/lisinopril    Hyperlipidemia-simvastatin    Coronary disease status post PCI 2017.  2 weeks from March 19 resume ASA    DVT prophylaxis-SCDs     TEAM CONF - MARCH 28 - STAIRS CTG. GAIT 150 FEET CTG NO DEVICE, OCC LOB IF MOVE TOO QUICKLY. 12 STAIRS CTG. TOILET TRANSFERS CTG. BATH CTG. LBD CTG. UBD SET UP. DIFFICULTY FOLLOWING DIRECTIONS. MODERATE ANOMIC APHASIA/ PARAPHRASIA. INCOMPLETE SENTENCES. MODERATE DIFFICULTY WITH NAMING, PERSEVERATION. CONTINENT BOWEL AND BLADDER. CAN BE IMPULSIVE.REC THERAPY TO SEE.   [facility] - SATURDAY WITH OUTPATIENT PT, OT, SLP . PREVIOUSLY REFERRED TO [facility] FOR OUTPATIENT PT,OT, SLP    Goal is for home with outpatient   therapies.  Barrier to discharge: Impaired cognitive-linguistic mobility self-care- work on speech, gross and fine motor control, visual perceptual/visual spatial skills, transfers, gait, ADLs to overcome.     Cesar Dewitt MD      Appropriate PPE was worn during the entire visit.  Hand hygiene was completed before and after.

## 2024-03-28 NOTE — PROGRESS NOTES
Case Management Discharge Note      Final Note: Patient to d/c home with wife and intermittent assist from adult children on Saturday, 3/30. Family to provide 24/7 supervision. Scheduled patient to start outpatient PT, OT, ST at [facility] on 4/1. Schedule provided to patient and daughter.         Selected Continued Care - Admitted Since 3/26/2024       Destination    No services have been selected for the patient.                Durable Medical Equipment    No services have been selected for the patient.                Dialysis/Infusion    No services have been selected for the patient.                Home Medical Care    No services have been selected for the patient.                Therapy    No services have been selected for the patient.                Community Resources    No services have been selected for the patient.                Community & DME    No services have been selected for the patient.                         Final Discharge Disposition Code: 01 - home or self-care

## 2024-03-28 NOTE — PLAN OF CARE
Goal Outcome Evaluation:  Plan of Care Reviewed With: patient           Outcome Evaluation: PT. AOx 3-4, needs reorientation sometimes, forgetful at times, aphasic, L leg drift, assist x1, Cont.to B/B LBM 3/25, on BLUE armband with family member at bedside, no complains of pain, doing good in therapy.

## 2024-03-28 NOTE — PROGRESS NOTES
Inpatient Rehabilitation Plan of Care Note    Plan of Care  Updated Problems/Interventions  Safety    [RN] Potential for Injury(Active)  Current Status(03/28/2024): Uses call light appropriately  Weekly Goal(04/02/2024): Cue to use call bell  Discharge Goal: Family /caregivers regarding safety precautions and need for  close supervision        Sphincter Control    [RN] Bladder Management(Active)  Current Status(03/28/2024): Continent 100%  Weekly Goal(04/02/2024): Continent 100%  Discharge Goal: Continent 100%    [RN] Bowel Management(Active)  Current Status(03/28/2024): Continent 100%  Weekly Goal(04/02/2024): Continent 100%  Discharge Goal: Continent 100%        Psychosocial    [RN] Behavior(Active)  Current Status(03/28/2024): Supportive family  Weekly Goal(04/02/2024): Alow opportunity to express concerns regarding current  status  Discharge Goal: Adequate coping regarding life changes with ongoing support        Body Function Structure    [RN] Skin Integrity(Active)  Current Status(03/28/2024): Skin intact  Weekly Goal(04/02/2024): Skin intact  Discharge Goal: No skin breakdown/issues    Signed by: Yasmine Martin RN

## 2024-03-28 NOTE — THERAPY TREATMENT NOTE
"Inpatient Rehabilitation - Speech Language Pathology Treatment Note    [facility]     Patient Name: Robby Mccloud  : 1940  MRN: [id number]    Today's Date: 3/28/2024                   Admit Date: 3/26/2024       Visit Dx:    No diagnosis found.    Patient Active Problem List   Diagnosis    Intracerebral hemorrhage       Past Medical History:   Diagnosis Date    Cancer     Coronary artery disease     Elevated cholesterol     Hypertension     Stroke        Past Surgical History:   Procedure Laterality Date    ABDOMINAL SURGERY      CARDIAC CATHETERIZATION      COLONOSCOPY      SKIN BIOPSY         SLP Recommendation and Plan                                              Outcome Evaluation: Speech and language assessment completed this date. The Western Aphasia Battery - Revised (WAB-R) was used as a standardized assessment to evaluate patient's current skills. 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 Receptive language slightly stronger than expressive language. Patient's expressive language characterized by inconsistent semantic jargon, perseveration, paraphasias, and mild-moderate word finding difficulty. Provided incomplete sentences during the picture description task with some grammatical organization. Exhibited multiple paraphasias throughout the task (e.g. \"A woman with a misket\" for \"basket\"). Demonstrated mild-moderate difficulty with confrontational naming and verbal fluency/divergent naming. Patient ~50% accurate during confrontational naming task due to perseveration and paraphasias. For example, patient perseverated on the word \"screwdriver\" x6 after initial presentation. Strengths noted with sentence completion and responsive speech tasks. Auditory comprehension mildly impaired for multi-step directions and complex yes/no questions. Repetition WFL for words and phrases, however, patient unable to repeat sentences of 8+ utterance length. Demonstrated decreased " awareness toward current speech/language difficulties. Recommend ongoing speech therapy during inpatient stay to target cognitive-communication skills. (03/27/24 2387)                SLP EVALUATION (Last 72 Hours)       SLP SLC Evaluation       Row Name 03/28/24 1400 03/28/24 1000 03/27/24 2545             Communication Assessment/Intervention    Document Type therapy note (daily note)  -SR therapy note (daily note)  -SR evaluation  -SR      Subjective Information no complaints  -SR no complaints  -SR no complaints  -SR      Patient Observations alert;cooperative;agree to therapy  -SR alert;agree to therapy;cooperative  -SR alert;cooperative;agree to therapy  -SR      Patient/Family/Caregiver Comments/Observations -- Pt's daughter present during session  -SR --      Patient Effort good  -SR good  -SR good  -SR      Symptoms Noted During/After Treatment none  -SR none  -SR none  -SR         General Information    Patient Profile Reviewed -- -- yes  -SR      Pertinent History Of Current Problem -- -- 83 year old male admitted to acute rehab following hospitalization for ICH  -SR      Precautions/Limitations, Vision -- -- corrective lenses needed for reading  -SR      Precautions/Limitations, Hearing -- -- WFL;for purposes of eval  -SR      Prior Level of Function-Communication -- -- WFL  -SR      Plans/Goals Discussed with -- -- patient;family;agreed upon  -SR      Barriers to Rehab -- -- none identified  -SR      Patient's Goals for Discharge -- -- return to home  -SR      Family Goals for Discharge -- -- functional cognition;functional communication  -SR      Standardized Assessment Used -- -- Western Aphasia Battery  -SR         Pain    Additional Documentation -- -- Pain Scale: Numbers Pre/Post-Treatment (Group)  -SR         Pain Scale: Numbers Pre/Post-Treatment    Pretreatment Pain Rating 0/10 - no pain  -SR 0/10 - no pain  -SR 0/10 - no pain  -SR      Posttreatment Pain Rating 0/10 - no pain  -SR 0/10 - no pain   -SR 0/10 - no pain  -SR         Comprehension Assessment/Intervention    Comprehension Assessment/Intervention -- -- Auditory Comprehension  -SR         Auditory Comprehension Assessment/Intervention    Auditory Comprehension (Communication) -- -- mild impairment  -SR      Able to Identify Objects/Pictures (Communication) -- -- familiar objects;body part;WFL  -SR      Answers Questions (Communication) -- -- yes/no;wh questions;mild impairment  -SR      Able to Follow Commands (Communication) -- -- 2-step;3-step;mild impairment  -SR      Narrative Discourse -- -- simple paragraph level;mild impairment  -SR         Expression Assessment/Intervention    Expression Assessment/Intervention -- -- verbal expression  -SR         Verbal Expression Assessment/Intervention    Verbal Expression -- -- mild impairment;moderate impairment  -SR      Automatic Speech (Communication) -- -- response to greeting;alphabet;counting 1-20;days of week;months of year;WFL  -SR      Repetition -- -- sentences;mild impairment  -SR      Phrase Completion -- -- automatic/predictable;WFL  -SR      Responsive Naming -- -- mild impairment  -SR      Confrontational Naming -- -- mild impairment;perseverations;semantic paraphasias;phonemic paraphasias  -SR      Spontaneous/Functional Words -- -- mild impairment;moderate impairment  -SR      Sentence Formulation -- -- mild impairment;moderate impairment  -SR      Conversational Discourse/Fluency -- -- mild impairment;moderate impairment  -SR         Oral Motor Structure and Function    Oral Motor Structure and Function -- -- WNL  -SR         Oral Musculature and Cranial Nerve Assessment    Oral Motor General Assessment -- -- WFL  -SR         Standardized Tests    Formal Speech Language Tests -- -- WAB: Western Aphasia Battery  -SR         Spontaneous Speech    Information Content -- -- 7  -SR      Fluency -- -- 7  -SR      Spontaneous Speech Total -- -- 14  -SR         Comprehension    Yes/No  "Questions -- -- 48  -SR      Auditory Word Recongition -- -- 50  -SR      Sequential Commands -- -- 50  -SR      Comprehension Total -- -- 148  -SR         Repetition    Repetition Total -- -- 78  -SR         Naming    Object -- -- 32  -SR      Word Fluency -- -- 4  -SR      Sentence Completion -- -- 10  -SR      Responsive Speech -- -- 9  -SR      Naming Total -- -- 55  -SR         Aphasia    Aphasia Quotient -- -- 69.4  -SR      Aphasia Severity -- -- Moderate (51-75)  -SR      Type of Aphasia -- -- Anomic  -SR         Cortical    WAB Comments -- -- Speech and language assessment completed this date. The Western Aphasia Battery - Revised (WAB-R) was used as a standardized assessment to evaluate patient's current skills. 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 Receptive language slightly stronger than expressive language. Patient's expressive language characterized by inconsistent semantic jargon, perseveration, paraphasias, and mild-moderate word finding difficulty. Provided incomplete sentences during the picture description task with some grammatical organization. Exhibited multiple paraphasias throughout the task (e.g. \"A woman with a misket\" for \"basket\"). Demonstrated mild-moderate difficulty with confrontational naming and verbal fluency/divergent naming. Patient ~50% accurate during confrontational naming task due to perseveration and paraphasias. For example, patient perseverated on the word \"screwdriver\" x6 after initial presentation. Strengths noted with sentence completion and responsive speech tasks. Auditory comprehension mildly impaired for multi-step directions and complex yes/no questions. Repetition WFL for words and phrases, however, patient unable to repeat sentences of 8+ utterance length. Demonstrated decreased awareness toward current speech/language difficulties. Recommend ongoing speech therapy during inpatient stay to target cognitive-communication skills.  " -SR         SLP Evaluation Clinical Impressions    SLP Diagnosis -- -- mild-moderate;aphasia  -SR      Rehab Potential/Prognosis -- -- good  -SR      SLC Criteria for Skilled Therapy Interventions Met -- -- yes  -SR      Functional Impact -- -- needs 24 hour supervision;difficulty communicating wants, needs;difficulty communicating in an emergency;difficulty in expressing complex messages;Poor Judgement  -SR         Recommendations    Therapy Frequency (SLP SLC) -- -- 5 days per week  -SR      Predicted Duration Therapy Intervention (Days) -- -- until discharge  -SR      Anticipated Discharge Disposition (SLP) -- -- home with 24/7 care;home with  services  -SR         Communication Treatment Objective and Progress Goals (SLP)    Auditory Comprehension Treatment Objectives -- -- Auditory Comprehension Treatment Objectives (Group)  -SR      Verbal Expression Treatment Objectives -- -- Verbal Expression Treatment Objectives (Group)  -SR         Auditory Comprehension Treatment Objectives    Comprehend Questions Selection -- -- comprehend questions, SLP goal 1  -SR      Follow Directions Selection -- -- follow directions, SLP goal 1  -SR         Comprehend Questions Goal 1 (SLP)    Improve Ability to Comprehend Questions Goal 1 (SLP) -- -- complex yes/no questions;complex wh questions;80%;with minimal cues (75-90%)  -SR      Time Frame (Comprehend Questions Goal 1, SLP) -- -- by discharge  -SR         Follow Directions Goal 2 (SLP)    Improve Ability to Follow Directions Goal 1 (SLP) -- -- 2 step commands;80%;with minimal cues (75-90%)  -SR      Time Frame (Follow Directions Goal 1, SLP) -- -- by discharge  -SR         Verbal Expression Treatment Objectives    Word Retrieval Skills Selection -- -- word retrieval, SLP goal 1  -SR      Phrase and Sentence Level Response Selection -- -- phrase and sentence level response, SLP goal 1  -SR         Word Retrieval Skills Goal 1 (SLP)    Improve Word Retrieval Skills By Goal  1 (SLP) -- -- completing functional word finding tasks;80%;with minimal cues (75-90%)  -SR      Time Frame (Word Retrieval Goal 1, SLP) -- -- by discharge  -SR         Ability to Construct Phrase and Sentence Level Response Goal 1 (SLP)    Improve Ability to Construct Phrase and Sentence Level Responses By Goal 1 (SLP) -- -- constructing a sentence with a key word;80%;with minimal cues (75-90%)  -SR      Time Frame (Phrase and Sentence Level Response Goal 1, SLP) -- -- by discharge  -SR                User Key  (r) = Recorded By, (t) = Taken By, (c) = Cosigned By      Initials Name Effective Dates    SR Iveth Dillard, SLP 01/05/24 -                        EDUCATION    The patient has been educated in the following areas:       Cognitive Impairment Communication Impairment.             SLP GOALS       Row Name 03/28/24 1400 03/28/24 1000 03/27/24 0930       Comprehend Questions Goal 1 (SLP)    Improve Ability to Comprehend Questions Goal 1 (SLP) complex yes/no questions;complex wh questions;80%;with minimal cues (75-90%)  -SR -- complex yes/no questions;complex wh questions;80%;with minimal cues (75-90%)  -SR    Time Frame (Comprehend Questions Goal 1, SLP) by discharge  -SR -- by discharge  -SR    Progress/Outcomes (Comprehend Questions Goal 1, SLP) good progress toward goal  -SR -- --    Comment (Comprehend Questions Goal 1, SLP) Patient answered moderately-complex yes/no questions (comparisons) with 90% acc given NO cues.  -SR -- --       Follow Directions Goal 2 (SLP)    Improve Ability to Follow Directions Goal 1 (SLP) -- -- 2 step commands;80%;with minimal cues (75-90%)  -SR    Time Frame (Follow Directions Goal 1, SLP) -- -- by discharge  -SR       Word Retrieval Skills Goal 1 (SLP)    Improve Word Retrieval Skills By Goal 1 (SLP) completing functional word finding tasks;80%;with minimal cues (75-90%)  -SR completing functional word finding tasks;80%;with minimal cues (75-90%)  -SR completing functional word  "finding tasks;80%;with minimal cues (75-90%)  -SR    Time Frame (Word Retrieval Goal 1, SLP) by discharge  -SR by discharge  -SR by discharge  -SR    Progress/Outcomes (Word Retrieval Goal 1, SLP) good progress toward goal  -SR good progress toward goal  -SR --    Comment (Word Retrieval Goal 1, SLP) Patient named concrete item given verbal description/definition with 60% acc given NO cues; 80% acc given MIN-MOD cues. Accuracy increased with choices (Co3).  -SR Patient named concrete category given list of 3 objects with 30% acc given NO cues; 70% acc given MIN-MOD cues. Accuracy increased with semantic cue and/or verbal demonstration.  -SR --       Ability to Construct Phrase and Sentence Level Response Goal 1 (SLP)    Improve Ability to Construct Phrase and Sentence Level Responses By Goal 1 (SLP) constructing a sentence with a key word;80%;with minimal cues (75-90%)  -SR constructing a sentence with a key word;80%;with minimal cues (75-90%)  -SR constructing a sentence with a key word;80%;with minimal cues (75-90%)  -SR    Time Frame (Phrase and Sentence Level Response Goal 1, SLP) by discharge  -SR by discharge  -SR by discharge  -SR    Progress/Outcomes (Phrase and Sentence Level Response Goal 1, SLP) good progress toward goal  -SR good progress toward goal  -SR --    Comment (Phrase and Sentence Level Response Goal 1, SLP) Word finding and sentence production targeted with picture description activity. Patient generated x1 sentence to describe visual picture scene with 20% acc given NO cues; 60% acc given MIN cues; 100% acc given MOD cues. ST utilized response elaboration technique to encourage more detail with descriptions. Patient's expressive language characterized by paraphasias (e.g., \"laser day parade\" for \"Labor Day parade\"), perseveration, and mild-moderate word finding difficulty. Patient's daughter expressed interest in using activity at home with personal pictures and family albums. ST provided " "feedback and examples of strategies to use with activity. Patient's daughter verbalized and demonstrated good understanding.  -SR Written language mirrors verbal expression. Patient wrote 1-2 word phrase to complete the simple sentence with 30% acc given NO cues; 50% acc given MIN-MOD cues. Written language characterized by paraphasias (\"flowman\" for \"flowers\"), perseveration, semantic jargon, and mild word finding difficulty.  -SR --       Patient Will Implement Strategies Goal 1 (SLP)    Implement Strategies of Goal 1 (SLP) -- answering questions about cognitive/phsical changes;80%;with minimal cues (75-90%)  -SR --    Time Frame (Strategy Implementation Goal 1, SLP) -- by discharge  -SR --    Progress/Outcomes (Strategy Implementation Goal 1, SLP) -- goal ongoing  -SR --    Comment (Strategy Implementation Goal 1, SLP) -- Reviewed results of yesterday's speech/language assessment with patient and his daughter. Provided education on expressive aphasia, perseveration, and strategies to use to faciliate functional communication. Both verbalized understanding. Visual handouts provided to family with further explanations. Pt demonstrated decreased awareness of current speech and language difficulties. Ongoing education recommended.  -SR --              User Key  (r) = Recorded By, (t) = Taken By, (c) = Cosigned By      Initials Name Provider Type    Iveth Elkins SLP Speech and Language Pathologist                                Time Calculation:        Time Calculation- SLP       Row Name 03/28/24 1500 03/28/24 1241          Time Calculation- SLP    SLP Start Time 1400  -SR 1000  -SR     SLP Stop Time 1430  -SR 1030  -SR     SLP Time Calculation (min) 30 min  -SR 30 min  -SR     SLP Received On 03/28/24  -SR 03/28/24  -SR               User Key  (r) = Recorded By, (t) = Taken By, (c) = Cosigned By      Initials Name Provider Type    Iveth Elkins SLP Speech and Language Pathologist              "         Therapy Charges for Today       Code Description Service Date Service Provider Modifiers Qty    [id number] [facility] ASSESSMENT OF APHASIA PER HOUR 3/27/2024 Iveth Dillard SLP GN 2    [id number] [facility] TREATMENT SPEECH 4 3/28/2024 Iveth Dillard SLP GN 1                             JS Orosco  3/28/2024

## 2024-03-28 NOTE — PROGRESS NOTES
Physical Medicine and Rehabilitation  Inpatient Rehabilitation Interdisciplinary Plan of Care    Demographics            Age: 83Y            Gender: Male    Admission Date: 3/26/2024 5:18:00 PM  Rehabilitation Diagnosis:  intraparenchymal hemorrhage-4.3 cm-left frontal lobe  with scattered chronic parenchymal microhemorrhages  Amyloid angiopathy    Status post intraparenchymal hemorrhage-4.3 cm-left frontal lobe with scattered  chronic parenchymal microhemorrhages  Amyloid angiopathy    Impaired cognition    impaired mobility    impaired self-care    impaired speech- cpnsider trial of Namenda    Hold aspirin 81 mg for 2 weeks from March 19    Hypertension-long-term goal 130/80.  Metoprolol/lisinopril    Hyperlipidemia-simvastatin    Coronary disease status post PCI 2017.  2 weeks from March 19 resume ASA    DVT prophylaxis-SCDs    Plan of Care  Anticipated Discharge Date/Estimated Length of Stay: ELOS: 3/30  Anticipated Discharge Destination: Community discharge with assistance  Discharge Plan : Patient lives with wife in 2 story home with bedroom/bath on  first floor. One step entry  D/C plan is home with wife. Intermittent assist from adult children who live  local.  Medical Necessity Expected Level Rationale: Goals are to achieve a level of  supervision with  mobility and self-care and improved cognitive linguistic.  Rehabilitation prognosis fair.  Medical prognosis fair.  Intensity and Duration: an average of 3 hours/5 days per week  Medical Supervision and 24 Hour Rehab Nursing: x  Physical Therapy: x  PT Intensity/Duration: 1 hour / day, 5 days / week, for approximately 2 weeks  Occupational Therapy: x  OT Intensity/Duration: 1 hour / day, 5 days / week, for approximately 2 weeks  Speech and Language Therapy: x  SLP Intensity/Duration: 1 hour / day, 5 days / week, for approximately 2 weeks  Social Work: x  Therapeutic Recreation: x  Psychology: x  Registered Dietician: x  Updated (if changes indicated)  No  changes to plan.    Based on the patient's medical and functional status, their prognosis and  expected level of functional improvement is: Goals are to achieve a level of  supervision with  mobility and self-care and improved cognitive linguistic.  Rehabilitation prognosis fair.  Medical prognosis fair.    Interdisciplinary Problem/Goals/Status  Safety    [RN] Potential for Injury(Active)  Current Status(03/28/2024): Uses call light appropriately  Weekly Goal(04/02/2024): Cue to use call bell  Discharge Goal: Family /caregivers regarding safety precautions and need for  close supervision        Sphincter Control    [RN] Bladder Management(Active)  Current Status(03/28/2024): Continent 100%  Weekly Goal(04/02/2024): Continent 100%  Discharge Goal: Continent 100%    [RN] Bowel Management(Active)  Current Status(03/28/2024): Continent 100%  Weekly Goal(04/02/2024): Continent 100%  Discharge Goal: Continent 100%        Psychosocial    [RN] Behavior(Active)  Current Status(03/28/2024): Supportive family  Weekly Goal(04/02/2024): Alow opportunity to express concerns regarding current  status  Discharge Goal: Adequate coping regarding life changes with ongoing support        Body Function Structure    [RN] Skin Integrity(Active)  Current Status(03/28/2024): Skin intact  Weekly Goal(04/02/2024): Skin intact  Discharge Goal: No skin breakdown/issues        Mobility    [PT] Bed/Chair/Wheelchair(Active)  Current Status(03/27/2024): CGA no AD  Weekly Goal(04/03/2024): SBA no AD  Discharge Goal: SBA no AD    [PT] Walk(Active)  Current Status(03/27/2024): 150' CGA no AD  Weekly Goal(04/03/2024): BR w nsg  Discharge Goal: 150' SBA no AD    [OT] Toilet Transfers(Active)  Current Status(03/27/2024): CGA  Weekly Goal(03/29/2024): Supervision  Discharge Goal: Supervision    [OT] Tub/Shower Transfers(Active)  Current Status(03/27/2024): CGA  Weekly Goal(03/29/2024): Supervision  Discharge Goal: Supervision    [PT]  Stairs(Active)  Current Status(03/27/2024): 12 stairs CGA  Weekly Goal(04/03/2024): 12 stairs SBA  Discharge Goal: 12 stairs SBA        Self Care    [OT] Bathing(Active)  Current Status(03/27/2024): CGA shower  Weekly Goal(03/29/2024): Supervision  Discharge Goal: Supervision    [OT] Dressing (Lower)(Active)  Current Status(03/27/2024): CGA  Weekly Goal(03/29/2024): Supervision  Discharge Goal: Supervision    [OT] Dressing (Upper)(Active)  Current Status(03/27/2024): set up  Weekly Goal(03/29/2024): supervision  Discharge Goal: supervision    [OT] Grooming(Active)  Current Status(03/27/2024): set up  Weekly Goal(03/29/2024): supervision  Discharge Goal: supervision    [OT] Toileting(Active)  Current Status(03/27/2024): CGA  Weekly Goal(03/29/2024): Supervision  Discharge Goal: Supervision        Communication    [ST] Expression(Active)  Current Status(03/27/2024): Moderate anomic aphasia (per standardized  assessment). Receptive language slightly stronger than expressive language.  Expressive language characterized by inconsistent semantic jargon,  perseveration, paraphasias, and mild-moderate word finding difficulty.  Weekly Goal(04/03/2024): Patient will express wants/needs using communication  strategies and/or supports given MOD cues.  Discharge Goal: Patient will express wants/needs using communication strategies  and/or supports independently.      Comments: 3/28 CG no AD with transfers, can do 12 stairs CG, amb 150' CG no AD.  CG toilet and shower transfer.  CG bathing, CG shower, CG LBD.  Mod anomic  aphasia.  Cont bowel and bladder.    Signed by: Cesar Dewitt MD

## 2024-03-28 NOTE — PROGRESS NOTES
Inpatient Rehabilitation Plan of Care Note    Plan of Care  Care Plan Reviewed - No updates at this time.    Safety    Performed Intervention(s)  Safety rounds  Bed alarm and/or chair alarm  Falls precautions/protocol      Sphincter Control    Performed Intervention(s)  Encourage fluid intake  Perineal care  Encourage appropriate diet      Psychosocial    Performed Intervention(s)  Verbalizes needs and concerns  Therapeutic environmental set up  Support/peer groups      Body Function Structure    Performed Intervention(s)  Daily skin inspection  Positioning  Turning    Signed by: Micah Cordova RN

## 2024-03-28 NOTE — THERAPY TREATMENT NOTE
Inpatient Rehabilitation - Occupational Therapy Treatment Note    [facility]     Patient Name: Robby Mccloud  : 1940  MRN: [id number]    Today's Date: 3/28/2024                 Admit Date: 3/26/2024       No diagnosis found.    Patient Active Problem List   Diagnosis    Intracerebral hemorrhage       Past Medical History:   Diagnosis Date    Cancer     Coronary artery disease     Elevated cholesterol     Hypertension     Stroke        Past Surgical History:   Procedure Laterality Date    ABDOMINAL SURGERY      CARDIAC CATHETERIZATION      COLONOSCOPY      SKIN BIOPSY               IRF OT ASSESSMENT FLOWSHEET (Last 12 Hours)       IRF OT Evaluation and Treatment       Row Name 24 1542          OT Time and Intention    Document Type daily treatment  -AF     Mode of Treatment occupational therapy  -AF     Patient Effort good  -AF       Row Name 24 1542          General Information    Patient/Family/Caregiver Comments/Observations pt sitting up in bed in AM and PM sessions  -AF     General Observations of Patient daughter present in AM and PM sessions  -AF     Existing Precautions/Restrictions fall  -AF     Comment, General Information blue arm band  -AF       Row Name 24 1542          Pain Assessment    Pretreatment Pain Rating 0/10 - no pain  -AF     Posttreatment Pain Rating 0/10 - no pain  -AF       Row Name 24 1542          Cognition/Psychosocial    Affect/Mental Status (Cognition) WFL  -AF     Orientation Status (Cognition) oriented to;person;place;situation  -AF     Follows Commands (Cognition) follows one-step commands;75-90% accuracy;increased processing time needed;verbal cues/prompting required;repetition of directions required  -AF     Personal Safety Interventions fall prevention program maintained;gait belt;nonskid shoes/slippers when out of bed  -AF     Cognitive Function attention deficit;safety deficit  -AF     Attention Deficit (Cognition) minimal  deficit;focused/sustained attention  -AF     Safety Deficit (Cognition) impulsivity;insight into deficits/self-awareness  -AF       Row Name 03/28/24 1542          Lower Body Dressing    [place] Level (Lower Body Dressing) don;shoes/slippers;socks;set up  -AF     Position (Lower Body Dressing) supported sitting  -AF       Row Name 03/28/24 1542          Grooming    [place] Level (Grooming) grooming skills;standby assist  -AF     Position (Grooming) supported sitting  -AF     Comment (Grooming) standing level  -AF       Row Name 03/28/24 1542          Bed Mobility    Supine-Sit [place] (Bed Mobility) standby assist  -AF     Sit-Supine [place] (Bed Mobility) standby assist  -AF       Row Name 03/28/24 1542          Functional Mobility    Functional Mobility- Comment walked to therapy gym without AD CGA/SBA pt able to recall directions to therapy gym  -AF       Row Name 03/28/24 1542          Bed-Chair Transfer    Bed-Chair [place] (Transfers) standby assist  -AF       Row Name 03/28/24 1542          Chair-Bed Transfer    Chair-Bed [place] (Transfers) standby assist  -AF       Row Name 03/28/24 1542          Sit-Stand Transfer    Sit-Stand [place] (Transfers) standby assist  -AF       Row Name 03/28/24 1542          Stand-Sit Transfer    Stand-Sit [place] (Transfers) standby assist  -AF       Row Name 03/28/24 1542          Motor Skills    Results, 9 Hole Peg Test of Fine Motor Coordination FMC tasks with small peg and peg board design with MIN diffiuclty with design recreation. MIN difficulty shuffling cards and dealing, able to participate in die and writing task with verabl cues for directions increased legiblity in hand writing  -AF       Row Name 03/28/24 1542          Shoulder (Therapeutic Exercise)    Shoulder (Therapeutic Exercise) strengthening exercise  -AF     Shoulder Strengthening (Therapeutic Exercise) bilateral;flexion;extension;scapular  stabilization;sitting;standing;3 lb free weight;20 repititions  -AF       Row Name 03/28/24 1542          Elbow/Forearm (Therapeutic Exercise)    Elbow/Forearm (Therapeutic Exercise) strengthening exercise  -AF     Elbow/Forearm Strengthening (Therapeutic Exercise) bilateral;extension;flexion;3 lb free weight;20 repititions  -AF       Row Name 03/28/24 1542          Balance    Static Sitting Balance standby assist  -AF     Dynamic Sitting Balance standby assist  -AF       Row Name 03/28/24 1542          Positioning and Restraints    Pre-Treatment Position sitting in chair/recliner  -AF     Post Treatment Position chair  -AF     In Chair sitting;with family/caregiver;with PT  in AM  -AF     In Wheelchair sitting;exit alarm on;with SLP;with family/caregiver  in PM  -AF               User Key  (r) = Recorded By, (t) = Taken By, (c) = Cosigned By      Initials Name Effective Dates    AF Genet Redmond, OTR 06/16/21 -                      Occupational Therapy Education       Title: PT OT SLP Therapies (In Progress)       Topic: Occupational Therapy (In Progress)       Point: ADL training (In Progress)       Description:   Instruct learner(s) on proper safety adaptation and remediation techniques during self care or transfers.   Instruct in proper use of assistive devices.                  Learning Progress Summary             Patient Acceptance, E, NR by AF at 3/27/2024 1545    Comment: attempted education on awareness of deficits with patient requires reinforcement. pt was not able to state goals for rehab   Family Acceptance, E, NR by AF at 3/27/2024 1545    Comment: attempted education on awareness of deficits with patient requires reinforcement. pt was not able to state goals for rehab                         Point: Home exercise program (In Progress)       Description:   Instruct learner(s) on appropriate technique for monitoring, assisting and/or progressing therapeutic exercises/activities.                   Learning Progress Summary             Patient Acceptance, E, NR by AF at 3/27/2024 1545    Comment: attempted education on awareness of deficits with patient requires reinforcement. pt was not able to state goals for rehab   Family Acceptance, E, NR by AF at 3/27/2024 1545    Comment: attempted education on awareness of deficits with patient requires reinforcement. pt was not able to state goals for rehab                         Point: Precautions (In Progress)       Description:   Instruct learner(s) on prescribed precautions during self-care and functional transfers.                  Learning Progress Summary             Patient Acceptance, E, NR by AF at 3/27/2024 1545    Comment: attempted education on awareness of deficits with patient requires reinforcement. pt was not able to state goals for rehab   Family Acceptance, E, NR by AF at 3/27/2024 1545    Comment: attempted education on awareness of deficits with patient requires reinforcement. pt was not able to state goals for rehab                         Point: Body mechanics (In Progress)       Description:   Instruct learner(s) on proper positioning and spine alignment during self-care, functional mobility activities and/or exercises.                  Learning Progress Summary             Patient Acceptance, E, NR by AF at 3/27/2024 1545    Comment: attempted education on awareness of deficits with patient requires reinforcement. pt was not able to state goals for rehab   Family Acceptance, E, NR by AF at 3/27/2024 1545    Comment: attempted education on awareness of deficits with patient requires reinforcement. pt was not able to state goals for rehab                                         User Key       Initials Effective Dates Name Provider Type Discipline     06/16/21 -  Genet Redmond, OTR Occupational Therapist OT                        OT Recommendation and Plan    Planned Therapy Interventions (OT): activity tolerance training, adaptive equipment  training, BADL retraining, cognitive/visual perception retraining, functional balance retraining, neuromuscular control/coordination retraining, occupation/activity based interventions, patient/caregiver education/training, ROM/therapeutic exercise, strengthening exercise, transfer/mobility retraining               Outcome Measures       Row Name 03/27/24 1400             Functional Gait Assessment (FGA)    Gait Level Surface 3  -DP (r) JT (t) DP (c)      Change in Gait Speed 2  -DP (r) JT (t) DP (c)      Gait with Horizontal Head Turns 0  -DP (r) JT (t) DP (c)      Gait with Vertical Head Turns 3  -DP (r) JT (t) DP (c)      Gait and Pivot Turn 3  -DP (r) JT (t) DP (c)      Step Over Obstacle 0  -DP (r) JT (t) DP (c)      Gait with Narrow Base of Support 2  -DP (r) JT (t) DP (c)      Gait with Eyes Closed 3  -DP (r) JT (t) DP (c)      Ambulating Backwards 3  -DP (r) JT (t) DP (c)      Steps 2  -DP (r) JT (t) DP (c)      FGA Total Score 21  -DP (r) JT (t)      FGA Comments pt displayed difficulty understanding instructions during assessment and results of this assessment may have been influenced by potential cognitive impairments.  -DP (r) JT (t) DP (c)         Functional Assessment    Outcome Measure Options FGA (Functional Gait Assessment)  -DP (r) JT (t) DP (c)                User Key  (r) = Recorded By, (t) = Taken By, (c) = Cosigned By      Initials Name Provider Type    DP Jace Arizmendi, PT Physical Therapist    JT Elkin Caraballo, PT Student PT Student                      Time Calculation:      Time Calculation- OT       Row Name 03/28/24 1549 03/28/24 1545          Time Calculation- OT    OT Start Time 1330  -AF 1030  -AF     OT Stop Time 1400  -AF 1100  -AF     OT Time Calculation (min) 30 min  -AF 30 min  -AF               User Key  (r) = Recorded By, (t) = Taken By, (c) = Cosigned By      Initials Name Provider Type    AF Genet Redmond, OTR Occupational Therapist                  Therapy Charges for Today        Code Description Service Date Service Provider Modifiers Qty    98709395461 HC OT EVAL MOD COMPLEXITY 3 3/27/2024 Genet Redmond, OTR GO 1    08039358922 HC OT NEUROMUSC RE EDUCATION EA 15 MIN 3/27/2024 Genet Redmond, OTR GO 1    53356583056 HC OT SELF CARE/MGMT/TRAIN EA 15 MIN 3/27/2024 Genet Redmond, OTR GO 1    28576366507 HC OT SELF CARE/MGMT/TRAIN EA 15 MIN 3/28/2024 Genet Redmond, OTR GO 1    14510652923 HC OT NEUROMUSC RE EDUCATION EA 15 MIN 3/28/2024 Genet Redmond, OTR GO 1    22012508462 HC OT THERAPEUTIC ACT EA 15 MIN 3/28/2024 Genet Redmond, OTR GO 1    16069546668 HC OT THER PROC EA 15 MIN 3/28/2024 Genet Redmond, OTR GO 1                     ELIDIA Bañuelos  3/28/2024

## 2024-03-29 PROCEDURE — 97116 GAIT TRAINING THERAPY: CPT

## 2024-03-29 PROCEDURE — 99233 SBSQ HOSP IP/OBS HIGH 50: CPT | Performed by: PHYSICIAN ASSISTANT

## 2024-03-29 PROCEDURE — 97112 NEUROMUSCULAR REEDUCATION: CPT

## 2024-03-29 PROCEDURE — 97535 SELF CARE MNGMENT TRAINING: CPT

## 2024-03-29 PROCEDURE — 97110 THERAPEUTIC EXERCISES: CPT

## 2024-03-29 PROCEDURE — 97530 THERAPEUTIC ACTIVITIES: CPT

## 2024-03-29 PROCEDURE — 92507 TX SP LANG VOICE COMM INDIV: CPT

## 2024-03-29 RX ADMIN — METOPROLOL TARTRATE 25 MG: 25 TABLET, FILM COATED ORAL at 08:08

## 2024-03-29 RX ADMIN — METOPROLOL TARTRATE 25 MG: 25 TABLET, FILM COATED ORAL at 20:16

## 2024-03-29 RX ADMIN — LISINOPRIL 10 MG: 10 TABLET ORAL at 08:08

## 2024-03-29 RX ADMIN — Medication 20 MG: at 20:16

## 2024-03-29 NOTE — PLAN OF CARE
Goal Outcome Evaluation:  Plan of Care Reviewed With: patient        Progress: improving  Outcome Evaluation: Pt. is A/O x 2. Pt. is continent of bladder and bowel. Pt. ambulating in hallway with PT and he also went outside. Pt. is eager to be discharged tomorrow. Blue arm band in place for impulsiveness. Wifwe is at bedside.

## 2024-03-29 NOTE — PROGRESS NOTES
LOS: 3 days   Patient Care Team:  Ariana Back APRN as PCP - General (Nurse Practitioner)      MARTÍN HUIZAR  1940      ADMITTING DIAGNOSIS:  Left frontal lobe ICH, 4.3 cm, hold aspirin 2 weeks from March 19  Suspected cerebral amyloid angiopathy  Impaired cognition  Impaired mobility  Impaired self-care  Impaired speech        Subjective   Patient shaving, family at bedside.  They say he is a little  disappointed in his performance with cognitive therapy this morning.  He is up ambulating without device.  No issues to report.  He is perhaps quieter since the stroke.  Has scheduled appointments with neurology and neurosurgery        Objective     Vitals:    03/29/24 0500   BP: 142/76   Pulse: 72   Resp: 18   Temp: 97.7 °F (36.5 °C)   SpO2: 94%       Intake/Output Summary (Last 24 hours) at 3/29/2024 1202  Last data filed at 3/29/2024 0815  Gross per 24 hour   Intake 880 ml   Output --   Net 880 ml     PHYSICAL EXAM:         General: pt well appearing, no distress  HEENT: Normocephalic, conjunctiva normal, external canals normal, no nasal discharge, moist mucous membranes  CV: Regular rate and rhythm, no murmurs negative   Pulmonary: Normal respiratory effort, clear to auscultation bilaterally  Mental: alert, decent attention, non fluent speech, difficulty naming high-frequency objects, speech   CN: CN II-XII intact, PERRL, EOMi, no gaze palsy or nystagmus, no facial assymetry, intact hearing to spoken voice, symmetric palate elevation, tongue midline, good SCM strength  Motor: no abnormal movements, no pronator drift, normal tone, 5/5 strength b/l UE & LE  Sensory: normal sensation to crude touch,   Gait: normal gait, no ataxia, good stance good arm swing    Functional status: Gait 150 feet with no device, occasionally moves too quickly, difficulty following directions, moderate anomic aphasia paraphasia continent bowel and bladder    MEDICATIONS  Scheduled Meds:[START ON 4/3/2024] aspirin, 81 mg, Oral,  "Q24H  lisinopril, 10 mg, Oral, Q24H  metoprolol tartrate, 25 mg, Oral, Q12H  simvastatin, 20 mg, Oral, Nightly      Continuous Infusions:   PRN Meds:.  acetaminophen      RESULTS  No results found for: \"POCGLU\"  Results from last 7 days   Lab Units 03/26/24  0415 03/25/24  0337 03/24/24  0419   WBC 10*3/uL 7.02 10.98 8.48   HEMOGLOBIN g/dL 13.1* 13.3* 14.7   HEMATOCRIT % 39.7* 39.8* 44.1   PLATELETS 10*3/uL 217 233 228     Results from last 7 days   Lab Units 03/22/24  1329   POTASSIUM mmol/L 3.8           ASSESSMENT and PLAN    Intracerebral hemorrhage  Cerebral amyloid angiopathy  Impaired cognition  Impaired mobility  Impaired speech  CAD  Hypertension  Hyperlipidemia    1 ICH Of left frontal lobe with subdural extension, nontraumatic, nonaneurysmal, required antihypertensive drips at [facility] sounds like suspicion suspicious etiology CAA.  Aspirin on hold for 2 weeks from March 19    2 CAA.  Aspirin on hold..  Has history of CAD with stent placement.  Likely long-term discussion multidisciplinary with the specialist has neurology appointment scheduled as well     3 hypertension metoprolol lisinopril, long-term BP goal 130/80    4 history of CAD on statin and aspirin restart in 2 weeks    DVT ppx: SCDs  Diet: regular  Dispo: home on Saturday after therapy        YUNIOR Jaimes      Appropriate PPE was worn during the entire visit.  Hand hygiene was completed before and after.      "

## 2024-03-29 NOTE — PROGRESS NOTES
SECTION GG      Mobility Performance Discharge:     Roll Left and Right: Patient completed the activities by themself with no  assistance from a helper.   Sit to Lying: Patient completed the activities by themself with no assistance  from a helper.   Lying to Sitting on Side of Bed: Patient completed the activities by themself  with no assistance from a helper.   Sit to Stand: [place] provides verbal cues and/or touching/steadying and/or  contact guard assistance as patient completes activity. Assistance may be  provided throughout the activity or intermittently.   Chair/Bed to Chair Transfer: [place] provides verbal cues and/or  touching/steadying and/or contact guard assistance as patient completes  activity. Assistance may be provided throughout the activity or intermittently.   Car Transfer: [place] provides verbal cues and/or touching/steadying and/or  contact guard assistance as patient completes activity. Assistance may be  provided throughout the activity or intermittently.   Walk 10 Feet:   [place] provides verbal cues and/or touching/steadying and/or  contact guard assistance as patient completes activity. Assistance may be  provided throughout the activity or intermittently.  Walk 50 Feet with 2 Turns:   [place] provides verbal cues and/or  touching/steadying and/or contact guard assistance as patient completes  activity. Assistance may be provided throughout the activity or intermittently.  Walk 150 Feet:   [place] provides verbal cues and/or touching/steadying and/or  contact guard assistance as patient completes activity. Assistance may be  provided throughout the activity or intermittently.  Walking 10 Feet on Uneven Surfaces:   [place] provides verbal cues and/or  touching/steadying and/or contact guard assistance as patient completes  activity. Assistance may be provided throughout the activity or intermittently.  1 Step Over Curb or Up/Down Stair:   [place] provides verbal cues and/or  touching/steadying  and/or contact guard assistance as patient completes  activity. Assistance may be provided throughout the activity or intermittently.  4 Steps Up and Down, With/Without Rail:   [place] provides verbal cues and/or  touching/steadying and/or contact guard assistance as patient completes  activity. Assistance may be provided throughout the activity or intermittently.  12 Steps Up and Down, With/Without Rail:   [place] provides verbal cues and/or  touching/steadying and/or contact guard assistance as patient completes  activity. Assistance may be provided throughout the activity or intermittently.  Picking up an Object:   [place] provides verbal cues and/or touching/steadying  and/or contact guard assistance as patient completes activity. Assistance may be  provided throughout the activity or intermittently. Uses Wheelchair and/or  Scooter: No    Section J. Health Conditions (Pain):  Pain Interference with Therapy Activities:   Rarely or not at all  Pain Interference with Day-to-Day Activities:   Rarely or not at all    Signed by: Tiffanie Taylor, PT

## 2024-03-29 NOTE — THERAPY DISCHARGE NOTE
Inpatient Rehabilitation - IRF Speech Language Pathology   Swallow Treatment Note/Discharge   [facility]     Patient Name: Robby Mccloud  : 1940  MRN: [id number]  Today's Date: 3/29/2024               Admit Date: 3/26/2024    Visit Dx:    No diagnosis found.  Patient Active Problem List   Diagnosis    Intracerebral hemorrhage         SLP Recommendation and Plan                    Anticipated Discharge Disposition (SLP): home with OP services (24 1523)                Patient admitted and discharging with moderate anomic aphasia with inconsistent semantic jargon, perseveration, paraphasias, and mild-moderate word finding difficulties. Functional communication also impacted when patient fatigues. Unable to meet targeted goals due to brief rehab stay. Anticipate therapy at next level of care                            Therapy Treatment    Evaluation/Coping         Vitals/Pain/Safety    Pain  Pretreatment Pain Ratin/10 - no pain (24 1400)  Posttreatment Pain Ratin/10 - no pain (24 1400)  Pain Assessment  Pretreatment Pain Ratin/10 - no pain (24 1400)  Posttreatment Pain Ratin/10 - no pain (24 1400)  Pain  Additional Documentation: Pain Scale: Numbers Pre/Post-Treatment (Group) (24 0930)  Pain Scale: Numbers Pre/Post-Treatment  Pretreatment Pain Ratin/10 - no pain (24 1400)  Posttreatment Pain Ratin/10 - no pain (24 1400)    Cognition/Communication    Auditory Comprehension Assessment/Intervention  Auditory Comprehension (Communication): mild impairment (24)  Able to Identify Objects/Pictures (Communication): familiar objects, body part, WFL (24)  Answers Questions (Communication): yes/no, wh questions, mild impairment (24)  Able to Follow Commands (Communication): 2-step, 3-step, mild impairment (24)  Narrative Discourse: simple paragraph level, mild impairment (24)  Verbal Expression  Assessment/Intervention  Verbal Expression: mild impairment, moderate impairment (03/27/24 0930)  Automatic Speech (Communication): response to greeting, alphabet, counting 1-20, days of week, months of year, WFL (03/27/24 0930)  Repetition: sentences, mild impairment (03/27/24 0930)  Phrase Completion: automatic/predictable, WFL (03/27/24 0930)  Responsive Naming: mild impairment (03/27/24 0930)  Confrontational Naming: mild impairment, perseverations, semantic paraphasias, phonemic paraphasias (03/27/24 0930)  Spontaneous/Functional Words: mild impairment, moderate impairment (03/27/24 0930)  Sentence Formulation: mild impairment, moderate impairment (03/27/24 0930)  Conversational Discourse/Fluency: mild impairment, moderate impairment (03/27/24 0930)    Oral Motor/Eating    Oral Musculature and Cranial Nerve Assessment  Oral Motor General Assessment: [facility] (03/27/24 0930)    Mobility/Basic Activities/Instrumental Activities/Motor/Modality                   ROM/MMT                   Sensory/Myotome/Dermatome/Edema               Posture/Balance/Special Tests/Exercise/Transportation/Sexual Function                   Orthotics/Residual Limb/Prosthetic Management              Outcome Summary          SLP GOALS       Row Name 03/29/24 1400 03/29/24 1000 03/28/24 1400       Comprehend Questions Goal 1 (SLP)    Improve Ability to Comprehend Questions Goal 1 (SLP) complex yes/no questions;complex wh questions;80%;with minimal cues (75-90%)  -CR -- complex yes/no questions;complex wh questions;80%;with minimal cues (75-90%)  -SR    Time Frame (Comprehend Questions Goal 1, SLP) by discharge  -CR -- by discharge  -SR    Progress/Outcomes (Comprehend Questions Goal 1, SLP) goal not met  -CR -- good progress toward goal  -SR    Comment (Comprehend Questions Goal 1, SLP) Patient answered moderately complex yes/no questions with 60% accuracy given NO cues. Goal not met due to brief rehab stay.  -CR -- Patient answered  moderately-complex yes/no questions (comparisons) with 90% acc given NO cues.  -SR       Follow Directions Goal 2 (SLP)    Improve Ability to Follow Directions Goal 1 (SLP) 2 step commands;80%;with minimal cues (75-90%)  -CR -- --    Time Frame (Follow Directions Goal 1, SLP) by discharge  -CR -- --    Progress/Outcomes (Follow Directions Goal 1, SLP) discharged from facility;unable to make needed progress  -CR -- --    Comment (Follow Directions Goal 1, SLP) Unable to target goal during brief rehab stay.  -CR -- --       Word Retrieval Skills Goal 1 (SLP)    Improve Word Retrieval Skills By Goal 1 (SLP) completing functional word finding tasks;80%;with minimal cues (75-90%)  -CR completing functional word finding tasks;80%;with minimal cues (75-90%)  -CR completing functional word finding tasks;80%;with minimal cues (75-90%)  -SR    Time Frame (Word Retrieval Goal 1, SLP) by discharge  -CR by discharge  -CR by discharge  -SR    Progress/Outcomes (Word Retrieval Goal 1, SLP) goal not met  -CR good progress toward goal  -CR good progress toward goal  -SR    Comment (Word Retrieval Goal 1, SLP) Categorizing simple food/drink task completed with 10% accuracy given NO cues. Unable to increase accuracy despite MAX cueing. Goal not met due to brief rehab stay.  -CR Given target letter and category, patient with 30% accurate word finding skills given NO cues during catgory members task, increased to 80% accuracy given MOD-MAX cues. Extended time to complete task due to perseveration and word finding difficulties.  -CR Patient named concrete item given verbal description/definition with 60% acc given NO cues; 80% acc given MIN-MOD cues. Accuracy increased with choices (Co3).  -SR       Ability to Construct Phrase and Sentence Level Response Goal 1 (SLP)    Improve Ability to Construct Phrase and Sentence Level Responses By Goal 1 (SLP) constructing a sentence with a key word;80%;with minimal cues (75-90%)  -CR --  "constructing a sentence with a key word;80%;with minimal cues (75-90%)  -SR    Time Frame (Phrase and Sentence Level Response Goal 1, SLP) by discharge  -CR -- by discharge  -SR    Progress/Outcomes (Phrase and Sentence Level Response Goal 1, SLP) goal not met  -CR -- good progress toward goal  -SR    Comment (Phrase and Sentence Level Response Goal 1, SLP) Goal not met due to brief rehab stay.  -CR -- Word finding and sentence production targeted with picture description activity. Patient generated x1 sentence to describe visual picture scene with 20% acc given NO cues; 60% acc given MIN cues; 100% acc given MOD cues. ST utilized response elaboration technique to encourage more detail with descriptions. Patient's expressive language characterized by paraphasias (e.g., \"laser day parade\" for \"Labor Day parade\"), perseveration, and mild-moderate word finding difficulty. Patient's daughter expressed interest in using activity at home with personal pictures and family albums. ST provided feedback and examples of strategies to use with activity. Patient's daughter verbalized and demonstrated good understanding.  -SR       Patient Will Implement Strategies Goal 1 (SLP)    Implement Strategies of Goal 1 (SLP) answering questions about cognitive/phsical changes;80%;with minimal cues (75-90%)  -CR -- --    Time Frame (Strategy Implementation Goal 1, SLP) by discharge  -CR -- --    Progress/Outcomes (Strategy Implementation Goal 1, SLP) goal not met  -CR -- --    Comment (Strategy Implementation Goal 1, SLP) Goal not met during brief rehab stay. Wife provided thorough aphasia education written material.  -CR -- --      Row Name 03/28/24 1000 03/27/24 0930          Comprehend Questions Goal 1 (SLP)    Improve Ability to Comprehend Questions Goal 1 (SLP) -- complex yes/no questions;complex wh questions;80%;with minimal cues (75-90%)  -SR     Time Frame (Comprehend Questions Goal 1, SLP) -- by discharge  -SR        Follow " "Directions Goal 2 (SLP)    Improve Ability to Follow Directions Goal 1 (SLP) -- 2 step commands;80%;with minimal cues (75-90%)  -SR     Time Frame (Follow Directions Goal 1, SLP) -- by discharge  -SR        Word Retrieval Skills Goal 1 (SLP)    Improve Word Retrieval Skills By Goal 1 (SLP) completing functional word finding tasks;80%;with minimal cues (75-90%)  -SR completing functional word finding tasks;80%;with minimal cues (75-90%)  -SR     Time Frame (Word Retrieval Goal 1, SLP) by discharge  -SR by discharge  -SR     Progress/Outcomes (Word Retrieval Goal 1, SLP) good progress toward goal  -SR --     Comment (Word Retrieval Goal 1, SLP) Patient named concrete category given list of 3 objects with 30% acc given NO cues; 70% acc given MIN-MOD cues. Accuracy increased with semantic cue and/or verbal demonstration.  -SR --        Ability to Construct Phrase and Sentence Level Response Goal 1 (SLP)    Improve Ability to Construct Phrase and Sentence Level Responses By Goal 1 (SLP) constructing a sentence with a key word;80%;with minimal cues (75-90%)  -SR constructing a sentence with a key word;80%;with minimal cues (75-90%)  -SR     Time Frame (Phrase and Sentence Level Response Goal 1, SLP) by discharge  -SR by discharge  -SR     Progress/Outcomes (Phrase and Sentence Level Response Goal 1, SLP) good progress toward goal  -SR --     Comment (Phrase and Sentence Level Response Goal 1, SLP) Written language mirrors verbal expression. Patient wrote 1-2 word phrase to complete the simple sentence with 30% acc given NO cues; 50% acc given MIN-MOD cues. Written language characterized by paraphasias (\"flowman\" for \"flowers\"), perseveration, semantic jargon, and mild word finding difficulty.  -SR --        Patient Will Implement Strategies Goal 1 (SLP)    Implement Strategies of Goal 1 (SLP) answering questions about cognitive/phsical changes;80%;with minimal cues (75-90%)  -SR --     Time Frame (Strategy Implementation " Goal 1, SLP) by discharge  -SR --     Progress/Outcomes (Strategy Implementation Goal 1, SLP) goal ongoing  -SR --     Comment (Strategy Implementation Goal 1, SLP) Reviewed results of yesterday's speech/language assessment with patient and his daughter. Provided education on expressive aphasia, perseveration, and strategies to use to faciliate functional communication. Both verbalized understanding. Visual handouts provided to family with further explanations. Pt demonstrated decreased awareness of current speech and language difficulties. Ongoing education recommended.  -SR --               User Key  (r) = Recorded By, (t) = Taken By, (c) = Cosigned By      Initials Name Provider Type    Iveth Elkins, SLP Speech and Language Pathologist    Marleen Hickman SLP Speech and Language Pathologist                    EDUCATION  The patient has been educated in the following areas:   Communication Impairment.             Time Calculation:    Time Calculation- SLP       Row Name 03/29/24 1524 03/29/24 1053          Time Calculation- SLP    SLP Start Time 1400  -CR 1000  -CR     SLP Stop Time 1430  -CR 1030  -CR     SLP Time Calculation (min) 30 min  -CR 30 min  -CR     SLP Received On 03/29/24  -CR 03/29/24  -CR               User Key  (r) = Recorded By, (t) = Taken By, (c) = Cosigned By      Initials Name Provider Type    Marleen Hickman SLP Speech and Language Pathologist                    Therapy Charges for Today       Code Description Service Date Service Provider Modifiers Qty    08160716054  ST TREATMENT SPEECH 4 3/29/2024 Marleen Miller SLP GN 1                 SLP Discharge Summary  Anticipated Discharge Disposition (SLP): home with OP services  Reason for Discharge: discharge from this facility  Progress Toward Achieving Short/long Term Goals: goals not met within established timelines, unable to make functional progress at this time  Discharge Destination: home w/ OP  services    Marleen Miller, SLP  3/29/2024

## 2024-03-29 NOTE — PROGRESS NOTES
"Section C. BIMS  Brief Interview for Mental Status (BIMS) was conducted.  Repetition of Three Words: Three words  Able to report correct year: Correct  Able to report correct month: Accurate within 5 days  Able to report correct day of the week: Correct  Able to recall \"sock\": No, could not recall  Able to recall \"blue\": No, could not recall  Able to recall \"bed\": Yes, no cue required    BIMS SUMMARY SCORE: 11 Moderately impaired    Section C. Signs and Symptoms of Delirium (from CAM)  Acute Change in Mental Status:   No  Inattention:   Behavior not present  Disorganized Thinking:   Behavior present, fluctuates (comes and goes, changes  in severity)  Altered Level of Consciousness:   Behavior not present    Signed by: Marleen Miller, Therapist    "

## 2024-03-29 NOTE — PROGRESS NOTES
Inpatient Rehabilitation Plan of Care Note    Plan of Care  Care Plan Reviewed - No updates at this time.    Safety    Performed Intervention(s)  Safety rounds  Bed alarm and/or chair alarm  Falls precautions/protocol      Sphincter Control    Performed Intervention(s)  Encourage fluid intake  Perineal care  Encourage appropriate diet      Psychosocial    Performed Intervention(s)  Verbalizes needs and concerns  Therapeutic environmental set up  Support/peer groups      Body Function Structure    Performed Intervention(s)  Daily skin inspection  Positioning  Turning    Signed by: Valentino Ugarte RN

## 2024-03-29 NOTE — PLAN OF CARE
Problem: Rehabilitation (IRF) Plan of Care  Goal: Plan of Care Review  Outcome: Ongoing, Progressing  Flowsheets (Taken 3/29/2024 0888)  Progress: improving  Plan of Care Reviewed With: patient  Outcome Evaluation: Pt is A&Ox3-4, forgetful at times, does need reorienting at times due to some aphasia/word finding issues, up w/ asst x1, cont of B/B, BM 3/28, blue arm band, spouse at bedside, pt is resting well, plan of care ongoing.   Goal Outcome Evaluation:  Plan of Care Reviewed With: patient        Progress: improving  Outcome Evaluation: Pt is A&Ox3-4, forgetful at times, does need reorienting at times due to some aphasia/word finding issues, up w/ asst x1, cont of B/B, BM 3/28, blue arm band, spouse at bedside, pt is resting well, plan of care ongoing.

## 2024-03-29 NOTE — THERAPY DISCHARGE NOTE
Inpatient Rehabilitation - Physical Therapy Treatment Note/Discharge  [facility]     Patient Name: Robby Mccloud  : 1940  MRN: [id number]  Today's Date: 3/29/2024                Admit Date: 3/26/2024    Visit Dx:  No diagnosis found.  Patient Active Problem List   Diagnosis    Intracerebral hemorrhage     Past Medical History:   Diagnosis Date    Cancer     Coronary artery disease     Elevated cholesterol     Hypertension     Stroke      Past Surgical History:   Procedure Laterality Date    ABDOMINAL SURGERY      CARDIAC CATHETERIZATION      COLONOSCOPY      SKIN BIOPSY         PT ASSESSMENT (Last 12 Hours)       IRF PT Evaluation and Treatment       Row Name 24 0841          PT Time and Intention    Document Type discharge evaluation  -MD     Mode of Treatment physical therapy  -MD     Patient/Family/Caregiver Comments/Observations Pt sitting in recliner w family present.  Pt showing no signs of acute distress.  -MD       Row Name 24 0841          General Information    Existing Precautions/Restrictions fall  -MD       Row Name 24 0841          Pain Assessment    Pretreatment Pain Rating 0/10 - no pain  -MD       Row Name 24 0841          Cognition/Psychosocial    Orientation Status (Cognition) oriented to;person  -MD     Follows Commands (Cognition) follows one-step commands;75-90% accuracy;increased processing time needed;verbal cues/prompting required;repetition of directions required  -MD     Personal Safety Interventions fall prevention program maintained;gait belt  -MD       Row Name 24 0841          Mobility    Advanced Gait Activity curb negotiation;rough/uneven surfaces  -MD     Additional Documentation Advanced Gait Activity (Row)  -MD       Row Name 24 0841          Bed Mobility    Rolling Left [place] (Bed Mobility) independent  -MD     Rolling Right [place] (Bed Mobility) independent  -MD     Scooting/Bridging [place] (Bed Mobility)  independent  -MD     Supine-Sit [place] (Bed Mobility) independent  -MD     Sit-Supine [place] (Bed Mobility) independent  -MD       Row Name 03/29/24 0841          Transfer Assessment/Treatment    Transfers car transfer  -MD       Row Name 03/29/24 0841          Sit-Stand Transfer    Sit-Stand [place] (Transfers) supervision  -MD       Row Name 03/29/24 0841          Stand-Sit Transfer    Stand-Sit [place] (Transfers) supervision  -MD       Row Name 03/29/24 0841          Car Transfer    [place] Level (Car Transfer) supervision  -MD       Row Name 03/29/24 0841          Gait/Stairs (Locomotion)    [place] Level (Gait) supervision  -MD     Distance in Feet (Gait) 300  -MD     Pattern (Gait) step-through  -MD     Deviations/Abnormal Patterns (Gait) base of support, narrow;bilateral deviations  -MD     Bilateral Gait Deviations forward flexed posture  -MD     [place] Level (Stairs) supervision  -MD     Handrail Location (Stairs) none  -MD     Number of Steps (Stairs) 4x1 and 12 x1  -MD     Ascending Technique (Stairs) step-over-step  -MD     Descending Technique (Stairs) step-over-step  -MD       Row Name 03/29/24 0841          Curb Negotiation (Mobility)    [place], Curb Negotiation supervision  -MD       Row Name 03/29/24 0841          Rough/Uneven Surface Gait Skills (Mobility)    [place], Gait on Rough/Uneven Surface (Mobility) supervision  -MD     Distance in Feet (Rough/Uneven Surface Gait) 10'x2 and 200'x1 outside on concrete, through grass, up and down curb and up and down ramp  -MD     Comment, Gait Rough/Uneven Surface (Mobility) pt able to retrieve object from floor w Supervision  -MD       Row Name 03/29/24 0841          Balance    Comment, Balance dynamic standing on blue foam mat tossing bean bags at floor markers of same color.  PT then turned dots over and wrote different colors on dots requiring pt to match the bean bag with the correct written color.  Max  VC and demonstration required w initial attempts.  Standing ball kicks w PT to ipsilateral sides  -MD       Row Name 03/29/24 0841          Positioning and Restraints    Pre-Treatment Position sitting in chair/recliner  -MD     Post Treatment Position chair  -MD     In Chair sitting;call light within reach;with family/caregiver  -MD       Row Name 03/29/24 0841          Bed Mobility Goal 1 (PT-IRF)    Progress/Outcomes (Bed Mobility Goal 1, PT-IRF) goal met  -MD       Row Name 03/29/24 0841          Transfer Goal 1 (PT-IRF)    Progress/Outcomes (Transfer Goal 1, PT-IRF) goal met  -MD       Row Name 03/29/24 0841          Gait/Walking Locomotion Goal 1 (PT-IRF)    Progress/Outcomes (Gait/Walking Locomotion Goal 1, PT-IRF) goal met  -MD       Row Name 03/29/24 0841          Stairs Goal 1 (PT-IRF)    Progress/Outcomes (Stairs Goal 1, PT-IRF) goal met  -MD       Row Name 03/29/24 0841          Discharge Summary (PT)    Discharge Summary Statement (PT) All PT goals met at this time.  Cognition limiting pts independence w functional mobility at this time.  No need for out pt PT.  Notified SW of the above.  -MD               User Key  (r) = Recorded By, (t) = Taken By, (c) = Cosigned By      Initials Name Provider Type    Tiffanie Najera, PT Physical Therapist                    Physical Therapy Education       Title: PT OT SLP Therapies (In Progress)       Topic: Physical Therapy (In Progress)       Point: Mobility training (Done)       Learning Progress Summary             Patient Acceptance, E, VU,NR by MD at 3/29/2024 0916    Comment: Family present and observed PT session.  Family states no questions for PT and no need for family teaching at this time.   Family Acceptance, E, VU,NR by MD at 3/29/2024 0916    Comment: Family present and observed PT session.  Family states no questions for PT and no need for family teaching at this time.                         Point: Home exercise program (Not Started)       Learner  Progress:  Not documented in this visit.              Point: Body mechanics (Not Started)       Learner Progress:  Not documented in this visit.              Point: Precautions (Done)       Learning Progress Summary             Patient Acceptance, E, VU by JT at 3/28/2024 1453    Acceptance, E, VU by JT at 3/27/2024 1429                                         User Key       Initials Effective Dates Name Provider Type Discipline    MD 06/16/21 -  Tiffanie Taylor, PT Physical Therapist PT    JT 01/24/24 -  Elkin Caraballo, PT Student PT Student PT                    PT Recommendation and Plan              Outcome Measures       Row Name 03/27/24 1400             Functional Gait Assessment (FGA)    Gait Level Surface 3  -DP (r) JT (t) DP (c)      Change in Gait Speed 2  -DP (r) JT (t) DP (c)      Gait with Horizontal Head Turns 0  -DP (r) JT (t) DP (c)      Gait with Vertical Head Turns 3  -DP (r) JT (t) DP (c)      Gait and Pivot Turn 3  -DP (r) JT (t) DP (c)      Step Over Obstacle 0  -DP (r) JT (t) DP (c)      Gait with Narrow Base of Support 2  -DP (r) JT (t) DP (c)      Gait with Eyes Closed 3  -DP (r) JT (t) DP (c)      Ambulating Backwards 3  -DP (r) JT (t) DP (c)      Steps 2  -DP (r) JT (t) DP (c)      FGA Total Score 21  -DP (r) JT (t)      FGA Comments pt displayed difficulty understanding instructions during assessment and results of this assessment may have been influenced by potential cognitive impairments.  -DP (r) JT (t) DP (c)         Functional Assessment    Outcome Measure Options FGA (Functional Gait Assessment)  -DP (r) JT (t) DP (c)                User Key  (r) = Recorded By, (t) = Taken By, (c) = Cosigned By      Initials Name Provider Type    DP Jace Arizmendi, PT Physical Therapist    JT Elkin Caraballo, PT Student PT Student                     Time Calculation:    PT Charges       Row Name 03/29/24 0841             Time Calculation    Start Time 0830  -MD      Stop Time 0930  -MD      Time Calculation  (min) 60 min  -MD      PT Received On 03/29/24  -MD                User Key  (r) = Recorded By, (t) = Taken By, (c) = Cosigned By      Initials Name Provider Type    Tiffanie Najera, PT Physical Therapist                    Therapy Charges for Today       Code Description Service Date Service Provider Modifiers Qty    63222753289 HC PT THERAPEUTIC ACT EA 15 MIN 3/29/2024 Tiffanie Taylor, PT GP 2    70808226238 HC PT THER PROC EA 15 MIN 3/29/2024 Tiffanie Taylor, PT GP 1    [id number] HC GAIT TRAINING EA 15 MIN 3/29/2024 Tiffanie Taylor, PT GP 1            PT G-Codes  Outcome Measure Options: FGA (Functional Gait Assessment)  AM-PAC 6 Clicks Score (PT): 17  FGA Total Score: 21         Tiffanie Taylor, IMANI  3/29/2024

## 2024-03-29 NOTE — PROGRESS NOTES
SECTION GG      Self Care Performance Discharge:   Oral Hygiene: [place] sets up or cleans up; patient completes activity. [place]  assists only prior to or following the activity.   Toileting Hygiene: : [place] provides verbal cues and/or touching/steadying  and/or contact guard assistance as patient completes activity.   Shower/Bathe Self: [place] provides verbal cues and/or touching/steadying and/or  contact guard assistance as patient completes activity.   Upper Body Dressing: [place] sets up or cleans up; patient completes activity.  [place] assists only prior to or following the activity.   Lower Body Dressing: [place] provides verbal cues and/or touching/steadying  and/or contact guard assistance as patient completes activity.   Putting On/Taking Off Footwear: [place] sets up or cleans up; patient completes  activity. [place] assists only prior to or following the activity.    Mobility Toilet Transfer Discharge: [place] provides verbal cues or  touching/steadying assistance as patient completes activity.    Signed by: Genet Redmond, OT

## 2024-03-29 NOTE — THERAPY DISCHARGE NOTE
Inpatient Rehabilitation - IRF Occupational Therapy Treatment Note/Discharge  [facility]     Patient Name: Robby Mccloud  : 1940  MRN: [id number]  Today's Date: 3/29/2024               Admit Date: 3/26/2024     No diagnosis found.  Patient Active Problem List   Diagnosis    Intracerebral hemorrhage     Past Medical History:   Diagnosis Date    Cancer     Coronary artery disease     Elevated cholesterol     Hypertension     Stroke      Past Surgical History:   Procedure Laterality Date    ABDOMINAL SURGERY      CARDIAC CATHETERIZATION      COLONOSCOPY      SKIN BIOPSY         IRF OT ASSESSMENT FLOWSHEET (Last 12 Hours)       IRF OT Evaluation and Treatment       Row Name 24 1524          OT Time and Intention    Document Type discharge evaluation;daily treatment  -AF     Mode of Treatment occupational therapy  -AF     Patient Effort good  -AF       Row Name 24 1524          General Information    Patient/Family/Caregiver Comments/Observations pt sitting up in bed in AM with family present, in PM up in room ready for a shower with wife present  -AF     Existing Precautions/Restrictions fall  -AF     Comment, General Information blue armband  -AF       Row Name 24 1524          Pain Assessment    Pretreatment Pain Rating 0/10 - no pain  -AF     Posttreatment Pain Rating 0/10 - no pain  -AF       Row Name 24 1524          Cognition/Psychosocial    Affect/Mental Status (Cognition) WFL  -AF     Orientation Status (Cognition) oriented to;person;place;situation  -AF     Follows Commands (Cognition) follows one-step commands;75-90% accuracy;increased processing time needed;verbal cues/prompting required;repetition of directions required  -AF     Personal Safety Interventions fall prevention program maintained;gait belt;nonskid shoes/slippers when out of bed  -AF     Cognitive Function attention deficit;safety deficit  -AF     Safety Deficit (Cognition) impulsivity;insight into  deficits/self-awareness  -AF     Comment, Cognition required MOD vc's during ADls for safe choices  -AF       [facility] Name 03/29/24 1524          Strength (Manual Muscle Testing)    Left Hand, Setting 2 (Dynamometer Testing) 55  -AF     Right Hand, Setting 2 (Dynamometer Testing) 73  -AF     Left Hand: Lateral (Key) Pinch Strength (Pinch Dynamometer Testing) 9  -AF     Right Hand: Lateral (Key) Pinch Strength (Pinch Dynamometer Testing) 13  -AF       [facility] Name 03/29/24 1524          Strength Comprehensive (MMT)    Comment, General Manual Muscle Testing (MMT) Assessment BUE 4+/5  -AF       [facility] Name 03/29/24 1524          Vision Assessment/Intervention    Vision Assessment Comment less inattention noted to the R side during funionctal tasks  -[facility] Name 03/29/24 1524          Bathing    [place] Level (Bathing) bathing skills;standby assist;supervision;verbal cues  -AF     Assistive Device (Bathing) grab bar/tub rail;hand held shower spray hose;tub bench  -AF     Position (Bathing) supported sitting;supported standing  -AF       Row Name 03/29/24 1524          Upper Body Dressing    [place] Level (Upper Body Dressing) upper body dressing skills;set up assistance  -AF       Row Name 03/29/24 1524          Lower Body Dressing    [place] Level (Lower Body Dressing) doff;don;pants/bottoms;shoes/slippers;socks;underwear;set up;standby assist  -AF     Position (Lower Body Dressing) supported sitting;supported standing  -AF       Row Name 03/29/24 1524          Grooming    [place] Level (Grooming) grooming skills;standby assist  -AF     Position (Grooming) unsupported standing  -AF       Row Name 03/29/24 1524          Toileting    [place] Level (Toileting) toileting skills;supervision  -AF       Row Name 03/29/24 1524          Bed Mobility    Supine-Sit [place] (Bed Mobility) independent  -AF     Sit-Supine [place] (Bed Mobility) independent  -AF       Row Name 03/29/24 1524           Functional Mobility    Functional Mobility- Comment walked in his room and to and from the tehrapy gym and sunroom with MIN vc's for directions  -AF       Row Name 03/29/24 1524          Bed-Chair Transfer    Bed-Chair [place] (Transfers) supervision  -AF       Row Name 03/29/24 1524          Chair-Bed Transfer    Chair-Bed [place] (Transfers) supervision  -AF       Row Name 03/29/24 1524          Sit-Stand Transfer    Sit-Stand [place] (Transfers) supervision  -AF       Row Name 03/29/24 1524          Stand-Sit Transfer    Stand-Sit [place] (Transfers) supervision  -AF       Row Name 03/29/24 1524          Toilet Transfer    Type (Toilet Transfer) sit-stand;stand-sit  -AF     [place] Level (Toilet Transfer) supervision  -AF     Assistive Device (Toilet Transfer) commode  -AF       Row Name 03/29/24 1524          Shower Transfer    Type (Shower Transfer) sit-stand;stand-sit  -AF     [place] Level (Shower Transfer) supervision  -AF     Assistive Device (Shower Transfer) grab bar, tub/shower  -AF       Row Name 03/29/24 1524          Motor Skills    Results, 9 Hole Peg Test of Fine Motor Coordination RUE: 28,LUE: 30  -AF       Row Name 03/29/24 1524          Shoulder (Therapeutic Exercise)    Shoulder Strengthening (Therapeutic Exercise) bilateral;flexion;extension;scapular stabilization;sitting;2 lb free weight;10 repetitions;2 sets  -AF       [facility] Name 03/29/24 1524          Elbow/Forearm (Therapeutic Exercise)    Elbow/Forearm Strengthening (Therapeutic Exercise) bilateral;flexion;supination;extension;pronation;sitting;2 lb free weight;10 repetitions;3 sets  -AF       [facility] Name 03/29/24 1524          Balance    Static Sitting Balance independent  -AF     Dynamic Sitting Balance independent;supervision  -AF     Static Standing Balance supervision;standby assist  -AF     Dynamic Standing Balance supervision;standby assist  -AF       Row Name 03/29/24 1524          Positioning and  Restraints    Pre-Treatment Position in bed  -AF     Post Treatment Position chair  -AF     In Chair with family/caregiver;call light within reach;encouraged to call for assist;with SLP  with wife and son in roomin AM, with SLP in PM  -AF               User Key  (r) = Recorded By, (t) = Taken By, (c) = Cosigned By      Initials Name Effective Dates    AF RedmondGenet shultz, OTR 06/16/21 -                        Occupational Therapy Education       Title: PT OT SLP Therapies (In Progress)       Topic: Occupational Therapy (Done)       Point: ADL training (Done)       Description:   Instruct learner(s) on proper safety adaptation and remediation techniques during self care or transfers.   Instruct in proper use of assistive devices.                  Learning Progress Summary             Patient Acceptance, E, VU,NR by AF at 3/29/2024 1529    Comment: educated on safety at home, wife understands but patient requires further education    Acceptance, E, NR by AF at 3/27/2024 1545    Comment: attempted education on awareness of deficits with patient requires reinforcement. pt was not able to state goals for rehab   Family Acceptance, E, VU,NR by AF at 3/29/2024 1529    Comment: educated on safety at home, wife understands but patient requires further education    Acceptance, E, NR by AF at 3/27/2024 1545    Comment: attempted education on awareness of deficits with patient requires reinforcement. pt was not able to state goals for rehab                         Point: Home exercise program (Done)       Description:   Instruct learner(s) on appropriate technique for monitoring, assisting and/or progressing therapeutic exercises/activities.                  Learning Progress Summary             Patient Acceptance, E, VU,NR by AF at 3/29/2024 1529    Comment: educated on safety at home, wife understands but patient requires further education    Acceptance, E, NR by AF at 3/27/2024 1545    Comment: attempted education on awareness  of deficits with patient requires reinforcement. pt was not able to state goals for rehab   Family Acceptance, E, VU,NR by AF at 3/29/2024 1529    Comment: educated on safety at home, wife understands but patient requires further education    Acceptance, E, NR by AF at 3/27/2024 1545    Comment: attempted education on awareness of deficits with patient requires reinforcement. pt was not able to state goals for rehab                         Point: Precautions (Done)       Description:   Instruct learner(s) on prescribed precautions during self-care and functional transfers.                  Learning Progress Summary             Patient Acceptance, E, VU,NR by AF at 3/29/2024 1529    Comment: educated on safety at home, wife understands but patient requires further education    Acceptance, E, NR by AF at 3/27/2024 1545    Comment: attempted education on awareness of deficits with patient requires reinforcement. pt was not able to state goals for rehab   Family Acceptance, E, VU,NR by AF at 3/29/2024 1529    Comment: educated on safety at home, wife understands but patient requires further education    Acceptance, E, NR by AF at 3/27/2024 1545    Comment: attempted education on awareness of deficits with patient requires reinforcement. pt was not able to state goals for rehab                         Point: Body mechanics (Done)       Description:   Instruct learner(s) on proper positioning and spine alignment during self-care, functional mobility activities and/or exercises.                  Learning Progress Summary             Patient Acceptance, E, VU,NR by AF at 3/29/2024 1529    Comment: educated on safety at home, wife understands but patient requires further education    Acceptance, E, NR by AF at 3/27/2024 1545    Comment: attempted education on awareness of deficits with patient requires reinforcement. pt was not able to state goals for rehab   Family Acceptance, E, VU,NR by AF at 3/29/2024 1529    Comment:  educated on safety at home, wife understands but patient requires further education    Acceptance, E, NR by AF at 3/27/2024 1545    Comment: attempted education on awareness of deficits with patient requires reinforcement. pt was not able to state goals for rehab                                         User Key       Initials Effective Dates Name Provider Type Discipline     06/16/21 -  Genet Redmond, OTR Occupational Therapist OT                    OT Recommendation and Plan  Planned Therapy Interventions (OT): activity tolerance training, adaptive equipment training, BADL retraining, cognitive/visual perception retraining, functional balance retraining, neuromuscular control/coordination retraining, occupation/activity based interventions, patient/caregiver education/training, ROM/therapeutic exercise, strengthening exercise, transfer/mobility retraining           OT IRF GOALS       Row Name 03/29/24 1530 03/27/24 1529          Transfer Goal 1 (OT-IRF)    Activity/Assistive Device (Transfer Goal 1, OT-IRF) -- toilet;shower chair;walk-in shower  -AF     [place] Level (Transfer Goal 1, OT-IRF) -- supervision required;standby assist  -AF     Time Frame (Transfer Goal 1, OT-IRF) -- long-term goal (LTG)  -AF     Progress/Outcomes (Transfer Goal 1, OT-IRF) goal met  -AF goal ongoing  -AF        Bathing Goal 1 (OT-IRF)    Activity/Device (Bathing Goal 1, OT-IRF) -- bathing skills, all;hand-held shower spray hose;grab bar, tub/shower  -AF     [place] Level (Bathing Goal 1, OT-IRF) -- supervision required  -AF     Time Frame (Bathing Goal 1, OT-IRF) -- long-term goal (LTG)  -AF     Progress/Outcomes (Bathing Goal 1, OT-IRF) goal met  -AF goal ongoing  -AF        UB Dressing Goal 1 (OT-IRF)    Activity/Device (UB Dressing Goal 1, OT-IRF) -- upper body dressing  -AF     [place] (UB Dress Goal 1, OT-IRF) -- supervision required  -AF     Time Frame (UB Dressing Goal 1, OT-IRF) -- long-term goal (LTG)  -AF      Progress/Outcomes (UB Dressing Goal 1, OT-IRF) goal met  -AF goal ongoing  -AF        LB Dressing Goal 1 (OT-IRF)    Activity/Device (LB Dressing Goal 1, OT-IRF) -- lower body dressing  -AF     [place] (LB Dressing Goal 1, OT-IRF) -- supervision required  -AF     Time Frame (LB Dressing Goal 1, OT-IRF) -- long-term goal (LTG)  -AF     Progress/Outcomes (LB Dressing Goal 1, OT-IRF) goal met  -AF goal ongoing  -AF        Grooming Goal 1 (OT-IRF)    Activity/Device (Grooming Goal 1, OT-IRF) -- grooming skills, all  -AF     [place] (Grooming Goal 1, OT-IRF) -- supervision required  -AF     Time Frame (Grooming Goal 1, OT-IRF) -- long-term goal (LTG)  -AF     Progress/Outcomes (Grooming Goal 1, OT-IRF) goal met  -AF goal ongoing  -AF        Toileting Goal 1 (OT-IRF)    Activity/Device (Toileting Goal 1, OT-IRF) -- toileting skills, all;grab bar/safety frame  -AF     [place] Level (Toileting Goal 1, OT-IRF) -- supervision required  -AF     Progress/Outcomes (Toileting Goal 1, OT-IRF) goal met  -AF goal ongoing  -AF     Time Frame (Toileting Goal 1, OT-IRF) -- long-term goal (LTG)  -AF        Strength Goal 1 (OT-IRF)    Strength Goal 1 (OT-IRF) -- increase strength in R hand by #5 to assist with ADL tasks  -AF     Time Frame (Strength Goal 1, OT-IRF) -- long-term goal (LTG)  -AF     Progress/Outcomes (Strength Goal 1, OT-IRF) goal met  -AF goal ongoing  -AF        Balance Goal 1 (OT)    Activity/Assistive Device (Balance Goal 1, OT) -- standing, static;standing, dynamic  -AF     [place] Level/Cues Needed (Balance Goal 1, OT) -- supervision required  with ADL tasks  -AF     Time Frame (Balance Goal 1, OT) -- long term goal (LTG)  -AF     Progress/Outcomes (Balance Goal 1, OT) goal met  -AF goal ongoing  -AF        Caregiver Training Goal 1 (OT-IRF)    Caregiver Training Goal 1 (OT-IRF) -- pt and family will demos safe techniques with ADLs, HEP, transfers prior to d/c home  -AF     Time Frame (Caregiver  Training Goal 1, OT-IRF) -- long-term goal (LTG)  -AF     Progress/Outcomes (Caregiver Training Goal 1, OT-IRF) goal met  -AF goal ongoing  -AF               User Key  (r) = Recorded By, (t) = Taken By, (c) = Cosigned By      Initials Name Provider Type    AF Genet Redmond, OTR Occupational Therapist                     Outcome Measures       Row Name 03/27/24 1400             Functional Gait Assessment (FGA)    Gait Level Surface 3  -DP (r) JT (t) DP (c)      Change in Gait Speed 2  -DP (r) JT (t) DP (c)      Gait with Horizontal Head Turns 0  -DP (r) JT (t) DP (c)      Gait with Vertical Head Turns 3  -DP (r) JT (t) DP (c)      Gait and Pivot Turn 3  -DP (r) JT (t) DP (c)      Step Over Obstacle 0  -DP (r) JT (t) DP (c)      Gait with Narrow Base of Support 2  -DP (r) JT (t) DP (c)      Gait with Eyes Closed 3  -DP (r) JT (t) DP (c)      Ambulating Backwards 3  -DP (r) JT (t) DP (c)      Steps 2  -DP (r) JT (t) DP (c)      FGA Total Score 21  -DP (r) JT (t)      FGA Comments pt displayed difficulty understanding instructions during assessment and results of this assessment may have been influenced by potential cognitive impairments.  -DP (r) JT (t) DP (c)         Functional Assessment    Outcome Measure Options FGA (Functional Gait Assessment)  -DP (r) JT (t) DP (c)                User Key  (r) = Recorded By, (t) = Taken By, (c) = Cosigned By      Initials Name Provider Type    DP Jace Arizmendi, PT Physical Therapist    JElkin Pruett, PT Student PT Student                    Time Calculation:    Time Calculation- OT       Row Name 03/29/24 1531 03/29/24 1530          Time Calculation- OT    OT Start Time 1330  -AF 1030  -AF     OT Stop Time 1400  -AF 1100  -AF     OT Time Calculation (min) 30 min  -AF 30 min  -AF               User Key  (r) = Recorded By, (t) = Taken By, (c) = Cosigned By      Initials Name Provider Type    AF Genet Redmond, OTR Occupational Therapist                    Therapy Charges for  Today       Code Description Service Date Service Provider Modifiers Qty    55644577014 HC OT SELF CARE/MGMT/TRAIN EA 15 MIN 3/28/2024 Genet Redmond, OTR GO 1    73119872946 HC OT NEUROMUSC RE EDUCATION EA 15 MIN 3/28/2024 Genet Redmond, OTR GO 1    52096835703 HC OT THERAPEUTIC ACT EA 15 MIN 3/28/2024 Genet Redmond, OTR GO 1    51633043169 HC OT THER PROC EA 15 MIN 3/28/2024 Genet Redmond, OTR GO 1    26001889060 HC OT SELF CARE/MGMT/TRAIN EA 15 MIN 3/29/2024 Genet Redmond, OTR GO 1    50093831254 HC OT NEUROMUSC RE EDUCATION EA 15 MIN 3/29/2024 Genet Redmond, OTR GO 1    90589692904 HC OT THER PROC EA 15 MIN 3/29/2024 Genet Redmond, OTR GO 1    23349050578 HC OT THERAPEUTIC ACT EA 15 MIN 3/29/2024 Genet Redmond, OTR GO 1                 OT Discharge Summary  Reason for Discharge: All goals achieved, Discharge from facility  Outcomes Achieved: Able to achieve all goals within established timeline  Discharge Destination: Home, Home with assist, Home with outpatient services (home with 24 hour supervision)    ELIDIA Bañuelos  3/29/2024

## 2024-03-30 VITALS
WEIGHT: 170.64 LBS | TEMPERATURE: 97.4 F | BODY MASS INDEX: 25.27 KG/M2 | DIASTOLIC BLOOD PRESSURE: 70 MMHG | SYSTOLIC BLOOD PRESSURE: 121 MMHG | OXYGEN SATURATION: 95 % | HEIGHT: 69 IN | HEART RATE: 67 BPM | RESPIRATION RATE: 18 BRPM

## 2024-03-30 RX ORDER — ASPIRIN 81 MG/1
81 TABLET, CHEWABLE ORAL DAILY
Start: 2024-04-02

## 2024-03-30 RX ORDER — ACETAMINOPHEN 325 MG/1
650 TABLET ORAL EVERY 6 HOURS PRN
Start: 2024-03-30

## 2024-03-30 RX ADMIN — LISINOPRIL 10 MG: 10 TABLET ORAL at 09:25

## 2024-03-30 RX ADMIN — METOPROLOL TARTRATE 25 MG: 25 TABLET, FILM COATED ORAL at 09:25

## 2024-03-30 NOTE — DISCHARGE SUMMARY
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  Metoprolol/lisinopril  Hyperlipidemia-simvastatin  Coronary disease status post PCI 2017.  2 weeks from March 19  DVT prophylaxis-SCDs    HISTORY OF PRESENT ILLNESS:    Patient is an 83-year-old male transferred from [facility] after intraparenchymal hemorrhage.  He initially presented on March 19, 2024 with altered mental status, word finding difficulties.  He was on aspirin 81 mg prior to admission.  Right frontal lobe intraparenchymal hemorrhage on admission CT of the head.  Neurosurgery and neurocritical care were consulted.  Was on a Cardene drip for uncontrolled hypertension during his initial time in the ICU.  Subsequent MRI of the brain with and without con trans revealed no significant change in large frontal intraparenchymal hemorrhage.  In addition there were noted to be superimposed scattered supratentorial chronic parenchymal microhemorrhages which in conjunction with a left frontal hematoma led to suspicion of cerebral aneurysm myeloid angiopathy  He is still on aspirin for 2 weeks from the date of the initial hemorrhage per neurosurgery.     Functionally, reading comprehension yes/no questions 2/5 with 5/5 with max cues.     Reading 8 errors with phonemic paraphasias, meeting words.  Follows one-step directions 100%.  Picture description 70% with perseveration and phonemic paraphasias.  Dynamic standing balance fair minus.  Contact-guard to min assist for balance activities.  Ran into a door frame on the right side returning to his  room.  Decreased visual scanning during task.  Ambulated 30 feet x 3 minimal contact-guard assist.  Significant difficulty following commands and performing cognitive task while actively moving-difficulty with dual tasking     Given his functional impairments and comorbidities now admitted for acute inpatient rehabilitation        Patient denies any headaches.  He does not describe his vision well.  Does not describe any focal weakness.  Continues with difficulty with his speech.  Indicates tolerating therapies.  No bowel or bladder complaints.    HOSPITAL COURSE:    Patient participated in a comprehensive acute inpatient rehabilitation program.     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  Still some weakness on the right side     impaired speech- consider trial of Namenda     History of coronary stents-hold aspirin 81 mg for 2 weeks from March 19, which would be April 2, 2024.  Needs close follow-up for progression of cerebral amyloid angiopathy.        Hypertension-long-term goal 130/80.  Metoprolol/lisinopril     Hyperlipidemia-simvastatin/Zetia     Coronary disease status post PCI 2017.  2 weeks from March 19 resume ASA     DVT prophylaxis-SCDs     TEAM CONF - MARCH 28 - STAIRS CTG. GAIT 150 FEET CTG NO DEVICE, OCC LOB IF MOVE TOO QUICKLY. 12 STAIRS CTG. TOILET TRANSFERS CTG. BATH CTG. LBD CTG. UBD SET UP. DIFFICULTY FOLLOWING DIRECTIONS. MODERATE ANOMIC APHASIA/ PARAPHRASIA. INCOMPLETE SENTENCES. MODERATE DIFFICULTY WITH NAMING, PERSEVERATION. CONTINENT BOWEL AND BLADDER. CAN BE IMPULSIVE.REC THERAPY TO SEE.   [facility] - SATURDAY WITH OUTPATIENT PT, OT, SLP . PREVIOUSLY REFERRED TO [facility] FOR OUTPATIENT PT,OT, SLP     Goal is for home with outpatient    "therapies.  Barrier to discharge: Impaired cognitive-linguistic mobility self-care- work on speech, gross and fine motor control, visual perceptual/visual spatial skills, transfers, gait, ADLs to overcome.      Disposition-March 30, 2024-achieved inpatient rehabilitation goals.  Medically stable.  Discharge home with outpatient therapies at [facility]-PT OT and speech therapy    Physical Exam near the time of discharge:    General: pt well appearing, no distress  HEENT: Normocephalic, conjunctiva normal, external canals normal, no nasal discharge, moist mucous membranes  CV: Regular rate and rhythm, no murmurs negative   Pulmonary: Normal respiratory effort, clear to auscultation bilaterally  Mental: alert, decent attention, non fluent speech, difficulty naming high-frequency objects, speech   CN: CN II-XII intact, PERRL, EOMi, no gaze palsy or nystagmus, no facial assymetry, intact hearing to spoken voice, symmetric palate elevation, tongue midline, good SCM strength  Motor: no abnormal movements, no pronator drift, normal tone, 5/5 strength b/l UE & LE  Sensory: normal sensation to crude touch,   Gait: normal gait, no ataxia, good stance good arm swing     Functional status: Gait 150 feet with no device, occasionally moves too quickly, difficulty following directions, moderate anomic aphasia paraphasia continent bowel and bladder    RESULTS:  No results found for: \"POCGLU\"  Results from last 7 days   Lab Units 03/26/24  0415 03/25/24  0337 03/24/24  0419   WBC 10*3/uL 7.02 10.98 8.48   HEMOGLOBIN g/dL 13.1* 13.3* 14.7   HEMATOCRIT % 39.7* 39.8* 44.1   PLATELETS 10*3/uL 217 233 228           Invalid input(s): \"LABALBU\", \"PROT\"           Discharge Medications        New Medications        Instructions Start Date   acetaminophen 325 MG tablet  Commonly known as: TYLENOL   650 mg, Oral, Every 6 Hours PRN             Changes to Medications        Instructions Start Date   aspirin 81 MG chewable tablet  What " changed: These instructions start on April 2, 2024. If you are unsure what to do until then, ask your doctor or other care provider.   81 mg, Oral, Daily, [place] until 04/03/24   Start Date: April 2, 2024            Continue These Medications        Instructions Start Date   ezetimibe 10 MG tablet  Commonly known as: ZETIA   10 mg, Oral, Daily      lisinopril 10 MG tablet  Commonly known as: PRINIVIL,ZESTRIL   10 mg, Oral, Daily      metoprolol tartrate 25 MG tablet  Commonly known as: LOPRESSOR   25 mg, Oral, 2 Times Daily      simvastatin 20 MG tablet  Commonly known as: ZOCOR   20 mg, Oral, Nightly                Follow-up Information       Colette Valles MD Follow up.    Specialty: Neurosurgery  Why: 4/24/2024 at 1:40 pm CT head  [facility] [address] [phone]  4/25/2024 Follow up with Dr. Valles 11:50am  [phone]  6/24/2024 at 9:40 am MRI -[phone]  6/26/2024 Follow up with Dr. Valles at 10:30 am  Contact information:  [facility] [address]  [facility] [id number] [phone]               [facility]. Go on 4/1/2024.    Why: You are scheduled to start ST on Monday, 4/1, at 2:30 p.m. You will start outpatient PT and OT on Monday, 4/8 at 1:00 p.m.   **Address is [address] [place] [address]. [address] not the address above. It is on 4th floor in [facility] building. Phone number above is correct.  Contact information:  [facility] [address]  [facility] [id number] [phone]             Ariana Back APRN Follow up.    Specialty: Nurse Practitioner  Why: nurse navigator will call back to make appointment office has to fit pt in for follow up  Contact information:  [address]  [address]  [facility] [id number] [phone]               Heather Salas, DO Follow up in 1 month(s).    Specialty: Physical Medicine and Rehabilitation  Why: Rehabilitation medicine follow-up for Dr. Dewitt.  May ask for [facility]  office  Contact information:  [facility]endo[facility]  [address] [place] [id number] [phone]                             Discharge follow up:  1) PCP - 1 to 2 weeks following discharge from rehab  2) Fermín Rosen, neurosurgery PA - 4 to 6 weeks     Neurosurgery at [facility] recommended holding ASA 81 for a full 2 weeks following the date of his hemorrhage    - Long-term BP goal less than 130/80  - Anti-Thrombotics - has coronary stents. Would resume ASA 81 mg daily in  2 weeks from March 19, 2024 which would be April 2, 2024, but will need close follow up for progression of cerebral amyloid angiopathy   Outpatient neurosurgery follow-up in 4 to 6 weeks    PREVIOUSLY REFERRED TO [facility] FOR OUTPATIENT PT,OT, SLP - DX: CVA    No driving.  No alcohol    Consider trial of Namenda-memantine for aphasia      >30 on discharge    Cesar Dewitt MD

## 2024-03-30 NOTE — PROGRESS NOTES
SECTION GG    Eating Performance Discharge: Patient completed the activities by themself with  no assistance from a helper.    Section B. Health Literacy  Frequency of Needing Assistance Reading:  Never    Section D. Mood  Presence of little interest or pleasure in doing things:   No  Frequency of having little interest or pleasure in doing things:   Never or 1  day  Presence of feeling down, depressed, or hopeless:   No  Frequency of feeling down, depressed, or hopeless:   Never or 1 day   Interview Ended. Above responses do not meet criteria to continue  Total Severity Score:   0    Section D. Social Isolation  Frequency of Feeling Lonely or Isolated:  Never    Section J. Health Conditions (Pain Effect on Sleep)  Pain Effect on Sleep:   Does not apply - Patient has not had any pain or hurting  in the past 5 days    Signed by: Greyson Peraza RN

## 2024-03-30 NOTE — PLAN OF CARE
Goal Outcome Evaluation:  Plan of Care Reviewed With: patient        Progress: improving  Outcome Evaluation: Pt A/O x3 forgetful pt takes meds whole with water , pt is continet of B/B wife at bedside to be d/c'd 3/30 pt rested well.

## 2024-03-30 NOTE — PROGRESS NOTES
Inpatient Rehabilitation Plan of Care Note    Plan of Care  Branch    Safety    Performed Intervention(s)  Safety rounds  Bed alarm and/or chair alarm  Falls precautions/protocol      Sphincter Control    Performed Intervention(s)  Encourage fluid intake  Perineal care  Encourage appropriate diet      Psychosocial    Performed Intervention(s)  Verbalizes needs and concerns  Therapeutic environmental set up  Support/peer groups      Body Function Structure    Performed Intervention(s)  Daily skin inspection  Positioning  Turning    Signed by: Yanna Ceballos RN

## 2024-03-30 NOTE — PLAN OF CARE
Goal Outcome Evaluation:  Plan of Care Reviewed With: patient           Outcome Evaluation: Pt. AOx3-4, ON RA, Cont.to B/B LBM 3/30, vital signs stable, discharge education rendered, pt. went with family.

## 2024-04-02 NOTE — PROGRESS NOTES
PPS CMG Coordinator  Inpatient Rehabilitation Admission    Ethnic Group: White.  Marital Status:  Marital Status: .    IRF Admission Date:  03/26/2024  Admission Class: Initial Rehab.  Admit From:  [facility]    Pre-Hospital Living: Home. Pre-Hospital Living  With: (2) Family/Relatives.    Payment Sources: Primary: Medicare - Medicare Advantage  Secondary: Not Listed.  Impairment Group: 01.4 No Paresis  Date of Onset of Impairment: 03/19/2024    Etiologic Diagnosis Code(s):  Rank Code      Description  1    I61.9     Nontraumatic intracerebral hemorrhage,                 unspecified    Comorbidities:  ICD    Are there any arthritis conditions recorded for Impairment Group, Etiologic  Diagnosis, or Comorbid Conditions that meet all of the regulatory requirements  for IRF classification (in 42 .29(b)(2)(x), (xi), and xii))? No    Presence of Pressure Ulcer:  No observed/documented pressure ulcers.    MEDICAL NEEDS  Height on Admission:  69 inches.  Weight on Admission:  171 pounds.    QUALITY INDICATORS  Prior Functioning:  Self Care: Patient completed all the activities by themself, with or without an  assistive device, with no assistance from a helper.  Indoor Mobility: Patient completed the activities by themself, with or without  an assistive device, with no assistance from a helper.  Stairs: Patient completed the activities by themself, with or without an  assistive device, with no assistance from a helper.  Functional Cognition: Patient completed the activities by themself, with or  without an assistive device, with no assistance from a helper.  Prior Device Use: Patient does not use manual or motorized wheelchair or  scooter, mechanical lift, walker, or an orthotic/prosthesis.    Bladder and Bowel: Bladder Continence: Always continent (no documented  incontinence).  Bowel Continence: Not rated (patient had an ostomy or did not have a bowel  movement for the entire 3  days).  Swallowing/Nutritional Status: Regular food (solids and liquids swallowed safely  without supervision or modified food or liquid consistency).  Special Conditions: Patient did not receive total parenteral nutrition treatment  at the time of admission.  Section A. Ethnicity/ Race/Language  Ethnicity: Not of , /a, Lao Origin  Race: White  Preferred Language: English  Requests  to Communicate:   No    Section I. Active Diagnosis: Comorbidities and Co-existing Conditions:   Patient  does not have PAD, PVD, or Diabetes Mellitus  Section J. Health Conditions: Patient has not had any falls in the past year.  Patient has not had major surgery during the 100 days prior to admission.  Section K. Swallowing/Nutritional Status  Nutritional Approaches on Admission:  Nutritional Approaches on Admission:  Therapeutic diet (e.g., low salt, diabetic, low cholesterol)  Section M. Skin Conditions  Unhealed Pressure Ulcer/Injuries at Stage 1 or  Higher on Admission:  No.  Section N. Medication:  Potential Clinically Significant Medication Issues: No issues found during  review  Section . High-Risk Drug Classes: Use and Indication                       Is Taking                    Indication noted  High-RiskDrug Class  I. Antiplatelet      Yes                          Yes    Section O. Special Treatments, Procedures, and Programs  None    Signed by: Patrica Mccloud RN

## 2024-04-02 NOTE — PROGRESS NOTES
PPS CMG Coordinator  Inpatient Rehabilitation Discharge    Mode of Locomotion: Walking.    Discharge Against Medical Advice:  No.  Discharge Information  Patient Discharged Alive:  Yes  Discharge Destination/Living Setting: Home.  At discharge, the patient was discharged to live (with) (02)  Family / Relatives    Diagnosis for Interruption/Death: ICD    Impairment Group: Stroke: 01.4 No Paresis    Comorbidities: ICD    Complications: ICD    QUALITY INDICATORS  Section A. Medication List  Medication List to Subsequent Provider:  Not applicable.  Patient was not  discharged to a subsequent provider.  Discharge Location:  01 - Home  Medication List to Patient at Discharge:  Yes - Current reconciled medication  list provided to the patient, family and/or caregiver  Route(s) of Medication List Transmission to Patient:  Electronic Health Record,  Verbal (e.g., in-person, telephone, video conferencing), Paper-based (e.g., fax,  copies, printouts)    Section J Health Conditions: Fall(s) Since Admission:  No    Section K. Swallowing/Nutritional Status  Nutritional Approaches Past 7 Days:   Therapeutic diet (e.g., low salt,  diabetic, low cholesterol)  Nutritional Approaches at Discharge:  Therapeutic diet (e.g., low salt,  diabetic, low cholesterol)    Section M. Skin Conditions Discharge:  Unhealed Pressure Ulcer(s) at Stage 1 or  Higher:  No    . Current Number of Unhealed Pressure Ulcers  Branch    Section N. Medication:  Medication Intervention: Not applicable - There were no potential clinically  significant medication issues identified since admission or patient is not  taking any medications.  Section . High-Risk Drug Classes: Use and Indication                       Is Taking                    Indication noted  High-RiskDrug Class  I. Antiplatelet      Yes                          Yes    Section O. Special Treatments, Procedures, and Programs  None    Signed by: Patrica Mccloud RN